# Patient Record
Sex: MALE | Race: WHITE | NOT HISPANIC OR LATINO | Employment: OTHER | URBAN - METROPOLITAN AREA
[De-identification: names, ages, dates, MRNs, and addresses within clinical notes are randomized per-mention and may not be internally consistent; named-entity substitution may affect disease eponyms.]

---

## 2017-02-09 ENCOUNTER — APPOINTMENT (OUTPATIENT)
Dept: GENERAL RADIOLOGY | Facility: CLINIC | Age: 72
End: 2017-02-09
Attending: STUDENT IN AN ORGANIZED HEALTH CARE EDUCATION/TRAINING PROGRAM
Payer: MEDICARE

## 2017-02-09 ENCOUNTER — HOSPITAL ENCOUNTER (EMERGENCY)
Facility: CLINIC | Age: 72
Discharge: ANOTHER HOSPITAL | End: 2017-02-09
Attending: STUDENT IN AN ORGANIZED HEALTH CARE EDUCATION/TRAINING PROGRAM | Admitting: STUDENT IN AN ORGANIZED HEALTH CARE EDUCATION/TRAINING PROGRAM
Payer: MEDICARE

## 2017-02-09 VITALS
RESPIRATION RATE: 15 BRPM | OXYGEN SATURATION: 91 % | DIASTOLIC BLOOD PRESSURE: 52 MMHG | SYSTOLIC BLOOD PRESSURE: 100 MMHG | TEMPERATURE: 99.3 F

## 2017-02-09 DIAGNOSIS — A41.9 SEPSIS, DUE TO UNSPECIFIED ORGANISM: ICD-10-CM

## 2017-02-09 DIAGNOSIS — R09.02 HYPOXIA: ICD-10-CM

## 2017-02-09 DIAGNOSIS — J10.1 INFLUENZA A: ICD-10-CM

## 2017-02-09 DIAGNOSIS — D69.6 THROMBOCYTOPENIA (H): ICD-10-CM

## 2017-02-09 DIAGNOSIS — J18.9 PNEUMONIA OF LEFT LOWER LOBE DUE TO INFECTIOUS ORGANISM: ICD-10-CM

## 2017-02-09 LAB
ALBUMIN SERPL-MCNC: 3.8 G/DL (ref 3.4–5)
ALP SERPL-CCNC: 59 U/L (ref 40–150)
ALT SERPL W P-5'-P-CCNC: 20 U/L (ref 0–70)
ANION GAP SERPL CALCULATED.3IONS-SCNC: 9 MMOL/L (ref 3–14)
APTT PPP: 36 SEC (ref 22–37)
AST SERPL W P-5'-P-CCNC: 36 U/L (ref 0–45)
BASE EXCESS BLDV CALC-SCNC: 0.8 MMOL/L
BILIRUB SERPL-MCNC: 0.5 MG/DL (ref 0.2–1.3)
BUN SERPL-MCNC: 18 MG/DL (ref 7–30)
CALCIUM SERPL-MCNC: 8.5 MG/DL (ref 8.5–10.1)
CHLORIDE SERPL-SCNC: 97 MMOL/L (ref 94–109)
CO2 SERPL-SCNC: 27 MMOL/L (ref 20–32)
CREAT SERPL-MCNC: 0.99 MG/DL (ref 0.66–1.25)
FLUAV+FLUBV AG SPEC QL: ABNORMAL
FLUAV+FLUBV AG SPEC QL: ABNORMAL
GFR SERPL CREATININE-BSD FRML MDRD: 74 ML/MIN/1.7M2
GLUCOSE SERPL-MCNC: 107 MG/DL (ref 70–99)
HCO3 BLDV-SCNC: 26 MMOL/L (ref 21–28)
INR PPP: 1.17 (ref 0.86–1.14)
LACTATE BLD-SCNC: 2.3 MMOL/L (ref 0.7–2.1)
PCO2 BLDV: 46 MM HG (ref 40–50)
PH BLDV: 7.37 PH (ref 7.32–7.43)
PO2 BLDV: 28 MM HG (ref 25–47)
POTASSIUM SERPL-SCNC: 3.5 MMOL/L (ref 3.4–5.3)
PROT SERPL-MCNC: 8.1 G/DL (ref 6.8–8.8)
SODIUM SERPL-SCNC: 133 MMOL/L (ref 133–144)
SPECIMEN SOURCE: ABNORMAL
TROPONIN I SERPL-MCNC: NORMAL UG/L (ref 0–0.04)

## 2017-02-09 PROCEDURE — 93005 ELECTROCARDIOGRAM TRACING: CPT

## 2017-02-09 PROCEDURE — 84484 ASSAY OF TROPONIN QUANT: CPT | Performed by: STUDENT IN AN ORGANIZED HEALTH CARE EDUCATION/TRAINING PROGRAM

## 2017-02-09 PROCEDURE — 85025 COMPLETE CBC W/AUTO DIFF WBC: CPT | Performed by: STUDENT IN AN ORGANIZED HEALTH CARE EDUCATION/TRAINING PROGRAM

## 2017-02-09 PROCEDURE — 99285 EMERGENCY DEPT VISIT HI MDM: CPT | Mod: 25 | Performed by: STUDENT IN AN ORGANIZED HEALTH CARE EDUCATION/TRAINING PROGRAM

## 2017-02-09 PROCEDURE — 96366 THER/PROPH/DIAG IV INF ADDON: CPT

## 2017-02-09 PROCEDURE — 85730 THROMBOPLASTIN TIME PARTIAL: CPT | Performed by: STUDENT IN AN ORGANIZED HEALTH CARE EDUCATION/TRAINING PROGRAM

## 2017-02-09 PROCEDURE — 80053 COMPREHEN METABOLIC PANEL: CPT | Performed by: STUDENT IN AN ORGANIZED HEALTH CARE EDUCATION/TRAINING PROGRAM

## 2017-02-09 PROCEDURE — 83605 ASSAY OF LACTIC ACID: CPT | Performed by: STUDENT IN AN ORGANIZED HEALTH CARE EDUCATION/TRAINING PROGRAM

## 2017-02-09 PROCEDURE — 71020 XR CHEST 2 VW: CPT

## 2017-02-09 PROCEDURE — 93010 ELECTROCARDIOGRAM REPORT: CPT | Performed by: STUDENT IN AN ORGANIZED HEALTH CARE EDUCATION/TRAINING PROGRAM

## 2017-02-09 PROCEDURE — 96365 THER/PROPH/DIAG IV INF INIT: CPT

## 2017-02-09 PROCEDURE — 87040 BLOOD CULTURE FOR BACTERIA: CPT | Mod: 91 | Performed by: STUDENT IN AN ORGANIZED HEALTH CARE EDUCATION/TRAINING PROGRAM

## 2017-02-09 PROCEDURE — 99285 EMERGENCY DEPT VISIT HI MDM: CPT | Mod: 25

## 2017-02-09 PROCEDURE — 25000128 H RX IP 250 OP 636: Performed by: STUDENT IN AN ORGANIZED HEALTH CARE EDUCATION/TRAINING PROGRAM

## 2017-02-09 PROCEDURE — 25000132 ZZH RX MED GY IP 250 OP 250 PS 637: Mod: GY | Performed by: STUDENT IN AN ORGANIZED HEALTH CARE EDUCATION/TRAINING PROGRAM

## 2017-02-09 PROCEDURE — A9270 NON-COVERED ITEM OR SERVICE: HCPCS | Mod: GY | Performed by: STUDENT IN AN ORGANIZED HEALTH CARE EDUCATION/TRAINING PROGRAM

## 2017-02-09 PROCEDURE — 87804 INFLUENZA ASSAY W/OPTIC: CPT | Performed by: STUDENT IN AN ORGANIZED HEALTH CARE EDUCATION/TRAINING PROGRAM

## 2017-02-09 PROCEDURE — 85610 PROTHROMBIN TIME: CPT | Performed by: STUDENT IN AN ORGANIZED HEALTH CARE EDUCATION/TRAINING PROGRAM

## 2017-02-09 PROCEDURE — 96361 HYDRATE IV INFUSION ADD-ON: CPT

## 2017-02-09 PROCEDURE — 25800025 ZZH RX 258: Performed by: STUDENT IN AN ORGANIZED HEALTH CARE EDUCATION/TRAINING PROGRAM

## 2017-02-09 PROCEDURE — 82803 BLOOD GASES ANY COMBINATION: CPT | Performed by: STUDENT IN AN ORGANIZED HEALTH CARE EDUCATION/TRAINING PROGRAM

## 2017-02-09 RX ORDER — OSELTAMIVIR PHOSPHATE 75 MG/1
75 CAPSULE ORAL 2 TIMES DAILY
Status: DISCONTINUED | OUTPATIENT
Start: 2017-02-09 | End: 2017-02-10 | Stop reason: HOSPADM

## 2017-02-09 RX ORDER — SODIUM CHLORIDE, SODIUM LACTATE, POTASSIUM CHLORIDE, CALCIUM CHLORIDE 600; 310; 30; 20 MG/100ML; MG/100ML; MG/100ML; MG/100ML
INJECTION, SOLUTION INTRAVENOUS CONTINUOUS
Status: DISCONTINUED | OUTPATIENT
Start: 2017-02-09 | End: 2017-02-09

## 2017-02-09 RX ORDER — CEFTRIAXONE 2 G/1
2 INJECTION, POWDER, FOR SOLUTION INTRAMUSCULAR; INTRAVENOUS EVERY 24 HOURS
Status: DISCONTINUED | OUTPATIENT
Start: 2017-02-09 | End: 2017-02-10 | Stop reason: HOSPADM

## 2017-02-09 RX ORDER — ACETAMINOPHEN 500 MG
1000 TABLET ORAL ONCE
Status: COMPLETED | OUTPATIENT
Start: 2017-02-09 | End: 2017-02-09

## 2017-02-09 RX ADMIN — SODIUM CHLORIDE 1000 ML: 9 INJECTION, SOLUTION INTRAVENOUS at 20:16

## 2017-02-09 RX ADMIN — OSELTAMIVIR PHOSPHATE 75 MG: 75 CAPSULE ORAL at 20:17

## 2017-02-09 RX ADMIN — IBUPROFEN 600 MG: 400 TABLET ORAL at 19:11

## 2017-02-09 RX ADMIN — SODIUM CHLORIDE 1000 ML: 9 INJECTION, SOLUTION INTRAVENOUS at 22:05

## 2017-02-09 RX ADMIN — AZITHROMYCIN MONOHYDRATE 500 MG: 500 INJECTION, POWDER, LYOPHILIZED, FOR SOLUTION INTRAVENOUS at 20:49

## 2017-02-09 RX ADMIN — SODIUM CHLORIDE, POTASSIUM CHLORIDE, SODIUM LACTATE AND CALCIUM CHLORIDE 1000 ML: 600; 310; 30; 20 INJECTION, SOLUTION INTRAVENOUS at 19:10

## 2017-02-09 RX ADMIN — ACETAMINOPHEN 1000 MG: 500 TABLET ORAL at 19:11

## 2017-02-09 RX ADMIN — CEFTRIAXONE 2 G: 2 INJECTION, POWDER, FOR SOLUTION INTRAMUSCULAR; INTRAVENOUS at 20:19

## 2017-02-10 NOTE — ED PROVIDER NOTES
History     Chief Complaint   Patient presents with     Fever     fever and cough started on Monday, just feeling weak      URI     HPI  Ag Galdamez is a 71 year old male with extensive history of tobacco use who present for evaluation of 2 days of fever, cough, nausea and vomiting. Patient explains that he has been around his grandchildren who had symptoms of nausea and vomiting. His symptoms initially started with nausea and vomiting Tuesday afternoon, since then he has been anorexic and developed cough with shortness of breath. The patient is no longer nauseous or suffering from vomiting. He denies headache, sore throat, neck stiffness, chest pain, abdominal pain, or genitourinary symptoms. He maintains that he has a baseline productive cough due to years of smoking but no formal diagnosis of emphysema or COPD. He is prescribed no routine medications. Of note, the patient states that he suffered from generalized weakness earlier today resulting in a fall, he bumped his right cheek on the bathroom floor but denies loss of consciousness, headache, or other injuries.    I have reviewed the Medications, Allergies, Past Medical and Surgical History, and Social History in the Epic system.    Patient Active Problem List   Diagnosis     Tobacco abuse     CARDIOVASCULAR SCREENING; LDL GOAL LESS THAN 130       Past Surgical History   Procedure Laterality Date     Hemorrhoidectomy  1963       Social History     Social History     Marital Status:      Spouse Name: N/A     Number of Children: N/A     Years of Education: N/A     Occupational History     Not on file.     Social History Main Topics     Smoking status: Current Every Day Smoker -- 1.00 packs/day     Types: Cigarettes     Smokeless tobacco: Never Used     Alcohol Use: Yes      Comment: occ     Drug Use: No     Sexual Activity: Not on file     Other Topics Concern     Not on file     Social History Narrative     No narrative on file       Family History    Problem Relation Age of Onset     HEART DISEASE Father          There is no immunization history on file for this patient.    Review of Systems  Constitutional: Positive for fever with chills.  HENT: Negative oral or throat pain.  Respiratory: Positive for productive cough with shortness of breath.  Cardiovascular: Negative for chest pain.  Gastrointestinal: Positive for nausea with vomiting, resolved. Denies abdominal pain, diarrhea, or blood with bowel movements.  Genitourinary: Negative for dysuria.  Musculoskeletal: Negative for back pain or recent injuries.  Neurological: Negative for headache or dizziness.    All others reviewed and are negative.      Physical Exam   BP: 140/71 mmHg  Heart Rate: 139  Temp: 102.1  F (38.9  C)  SpO2: 94 %  Physical Exam  Constitutional: Well developed, well nourished. Appears nontoxic and in no acute distress.   Head: Minor contusion of right maxilla with minimal tenderness, no crepitus, no other sign of facial trauma. Negative for Raccoon eyes and Mcdermott sign. No tenderness to palpation of facial bones or skull circumferentially.  Eyes: Conjunctivae are normal.  Neck: Neck supple and without nuchal rigidity.  Cardiovascular: No cyanosis. Tachycardia with regular rhythm. No audible murmurs noted.   Respiratory: Effort normal, no respiratory distress. Left lower lobe crackles, no wheezing or rhonchi.  Gastrointestinal: Soft, nondistended abdomen. Nontender and without guarding. No rigidity or rebound tenderness. Negative McBurney's point. Negative for Rodriguez's sign. Benign abdomen.   Musculoskeletal: Moves all extremities spontaneously and without complaint.  Neuro: Patient is alert and oriented.  Skin: Skin is warm and dry, not diaphoretic.      ED Course   Procedures               EKG Interpretation:      Interpreted by: Primitivo Ozuna  Time reviewed: Upon arrival     Symptoms at time of EKG: Fever and generalized weakness  Rhythm: Sinus  Rate: Tachycardia  Conduction:  RBBB  ST Segments/ T Waves: No pathologic ST-elevations or T-wave abnormalities.  Q Waves: None  Comparison to prior: None readily available    Clinical Impression: No sign of ischemia         Critical Care time:  none               Results for orders placed or performed during the hospital encounter of 02/09/17 (from the past 24 hour(s))   CBC with platelets differential   Result Value Ref Range    WBC 13.6 (H) 4.0 - 11.0 10e9/L    RBC Count 4.61 4.4 - 5.9 10e12/L    Hemoglobin 14.5 13.3 - 17.7 g/dL    Hematocrit 42.5 40.0 - 53.0 %    MCV 92 78 - 100 fl    MCH 31.5 26.5 - 33.0 pg    MCHC 34.1 31.5 - 36.5 g/dL    RDW 14.0 10.0 - 15.0 %    Platelet Count 16 (LL) 150 - 450 10e9/L    Diff Method Pending    Comprehensive metabolic panel   Result Value Ref Range    Sodium 133 133 - 144 mmol/L    Potassium 3.5 3.4 - 5.3 mmol/L    Chloride 97 94 - 109 mmol/L    Carbon Dioxide 27 20 - 32 mmol/L    Anion Gap 9 3 - 14 mmol/L    Glucose 107 (H) 70 - 99 mg/dL    Urea Nitrogen 18 7 - 30 mg/dL    Creatinine 0.99 0.66 - 1.25 mg/dL    GFR Estimate 74 >60 mL/min/1.7m2    GFR Estimate If Black 89 >60 mL/min/1.7m2    Calcium 8.5 8.5 - 10.1 mg/dL    Bilirubin Total 0.5 0.2 - 1.3 mg/dL    Albumin 3.8 3.4 - 5.0 g/dL    Protein Total 8.1 6.8 - 8.8 g/dL    Alkaline Phosphatase 59 40 - 150 U/L    ALT 20 0 - 70 U/L    AST 36 0 - 45 U/L   Lactic acid whole blood   Result Value Ref Range    Lactic Acid 2.3 (H) 0.7 - 2.1 mmol/L   INR   Result Value Ref Range    INR 1.17 (H) 0.86 - 1.14   PTT   Result Value Ref Range    PTT 36 22 - 37 sec   Troponin I   Result Value Ref Range    Troponin I ES  0.000 - 0.045 ug/L     <0.015  The 99th percentile for upper reference range is 0.045 ug/L.  Troponin values in   the range of 0.045 - 0.120 ug/L may be associated with risks of adverse   clinical events.     Blood gas venous   Result Value Ref Range    Ph Venous 7.37 7.32 - 7.43 pH    PCO2 Venous 46 40 - 50 mm Hg    PO2 Venous 28 25 - 47 mm Hg     Bicarbonate Venous 26 21 - 28 mmol/L    Base Excess Venous 0.8 mmol/L   Influenza A/B antigen   Result Value Ref Range    Influenza A/B Agn Specimen Nasopharyngeal     Influenza A (A) NEG     Positive   Test results must be correlated with clinical data. If necessary, results   should be confirmed by a molecular assay or viral culture.      Influenza B  NEG     Negative   Test results must be correlated with clinical data. If necessary, results   should be confirmed by a molecular assay or viral culture.     Chest XR,  PA & LAT    Narrative    XR CHEST 2 VW   2/9/2017 7:51 PM     HISTORY: cough, fever, LLL crackles    COMPARISON: None.      Impression    IMPRESSION: Hyperinflation consistent with COPD. On the lateral view  there is increased density posteriorly near the posterior costophrenic  angle that likely correlates to some retrocardiac increased density in  the AP view. These findings are suspicious for left lower lobe  infiltrate/pneumonia in a patient with COPD.    DEEPALI HANNON MD         Assessments & Plan (with Medical Decision Making)   Ag Galdamez is a 71 year old male who has not seen a physician in many years presenting to the department tonight for evaluation of 48 hours of fever, productive cough, dyspnea, and previously nausea with vomiting although those symptoms have resolved. His abdominal examination is benign however he was immediately found to be hypoxic on room air and febrile oral temperature 103 F. Lung auscultation reveals left lower lobe crackles and a chest x-ray confirms infiltrate. Rapid influenza test also positive for influenza A, patient admits he did not receive immunization this year. Interestingly his platelets are extremely low at 16.    The patient will require admission for continued monitoring and management of his pneumonia with positive influenza test meeting SIRS criteria. Unfortunately we do not have bed availability at our facility to accommodate this patient. He has  requested transfer to [facility] in [place]. Consulted hospitalist Dr. Marques regarding patient presentation workup. She agrees to accept patient as transfer and also with antibiotic selection of azithromycin and ceftriaxone.    The patient and accompanying family have been informed of his results and the recommendation for transfer. They have verbalized an understanding, all questions answered, and they are in agreement with the plan at this time.      Disclaimer: This note consists of symbols derived from keyboarding, dictation, and/or voice recognition software. As a result, there may be errors in the script that have gone undetected.  Please consider this when interpreting information found in the chart.        I have reviewed the nursing notes.    I have reviewed the findings, diagnosis, plan and need for follow up with the patient.    New Prescriptions    No medications on file       Final diagnoses:   Influenza A   Pneumonia of left lower lobe due to infectious organism   Sepsis, due to unspecified organism (H)   Hypoxia   Thrombocytopenia (H)       2/9/2017   [facility] EMERGENCY DEPARTMENT      Primitivo Ozuna DO  02/09/17 [id number]

## 2017-02-10 NOTE — ED NOTES
fever and cough started on Monday, just feeling weak, has an abrasion on his rt check from falling in the [place], wife states he can hardly walk, so she brought him in,

## 2017-02-13 LAB
BASOPHILS # BLD AUTO: 0.1 10E9/L (ref 0–0.2)
BASOPHILS NFR BLD AUTO: 1 %
DIFFERENTIAL METHOD BLD: ABNORMAL
ERYTHROCYTE [DISTWIDTH] IN BLOOD BY AUTOMATED COUNT: 14 % (ref 10–15)
HCT VFR BLD AUTO: 42.5 % (ref 40–53)
HGB BLD-MCNC: 14.5 G/DL (ref 13.3–17.7)
LYMPHOCYTES # BLD AUTO: 1.1 10E9/L (ref 0.8–5.3)
LYMPHOCYTES NFR BLD AUTO: 8 %
MCH RBC QN AUTO: 31.5 PG (ref 26.5–33)
MCHC RBC AUTO-ENTMCNC: 34.1 G/DL (ref 31.5–36.5)
MCV RBC AUTO: 92 FL (ref 78–100)
MONOCYTES # BLD AUTO: 0.7 10E9/L (ref 0–1.3)
MONOCYTES NFR BLD AUTO: 5 %
NEUTROPHILS # BLD AUTO: 11.7 10E9/L (ref 1.6–8.3)
NEUTROPHILS NFR BLD AUTO: 86 %
PLATELET # BLD AUTO: 16 10E9/L (ref 150–450)
RBC # BLD AUTO: 4.61 10E12/L (ref 4.4–5.9)
WBC # BLD AUTO: 13.6 10E9/L (ref 4–11)

## 2017-02-15 LAB
BACTERIA SPEC CULT: NORMAL
BACTERIA SPEC CULT: NORMAL
Lab: NORMAL
Lab: NORMAL
MICRO REPORT STATUS: NORMAL
MICRO REPORT STATUS: NORMAL
SPECIMEN SOURCE: NORMAL
SPECIMEN SOURCE: NORMAL

## 2017-02-17 ENCOUNTER — OFFICE VISIT - HEALTHEAST (OUTPATIENT)
Dept: FAMILY MEDICINE | Facility: CLINIC | Age: 72
End: 2017-02-17

## 2017-02-17 DIAGNOSIS — J96.01 ACUTE RESPIRATORY FAILURE WITH HYPOXIA (H): ICD-10-CM

## 2017-02-17 DIAGNOSIS — J18.9 CAP (COMMUNITY ACQUIRED PNEUMONIA): ICD-10-CM

## 2017-02-17 DIAGNOSIS — J10.1 INFLUENZA A: ICD-10-CM

## 2017-02-17 DIAGNOSIS — D69.6 THROMBOCYTOPENIA (H): ICD-10-CM

## 2017-02-17 ASSESSMENT — MIFFLIN-ST. JEOR: SCORE: 1272.73

## 2017-02-22 ENCOUNTER — OFFICE VISIT - HEALTHEAST (OUTPATIENT)
Dept: PULMONOLOGY | Facility: OTHER | Age: 72
End: 2017-02-22

## 2017-02-22 DIAGNOSIS — J44.9 CHRONIC OBSTRUCTIVE PULMONARY DISEASE, UNSPECIFIED COPD TYPE (H): ICD-10-CM

## 2017-02-22 ASSESSMENT — MIFFLIN-ST. JEOR: SCORE: 1271.71

## 2017-04-20 ENCOUNTER — COMMUNICATION - HEALTHEAST (OUTPATIENT)
Dept: FAMILY MEDICINE | Facility: CLINIC | Age: 72
End: 2017-04-20

## 2017-04-20 DIAGNOSIS — J96.01 ACUTE RESPIRATORY FAILURE WITH HYPOXIA (H): ICD-10-CM

## 2017-07-20 ENCOUNTER — AMBULATORY - HEALTHEAST (OUTPATIENT)
Dept: PULMONOLOGY | Facility: OTHER | Age: 72
End: 2017-07-20

## 2017-07-20 DIAGNOSIS — J44.9 COPD (CHRONIC OBSTRUCTIVE PULMONARY DISEASE) (H): ICD-10-CM

## 2021-05-19 ENCOUNTER — OFFICE VISIT (OUTPATIENT)
Dept: FAMILY MEDICINE | Facility: CLINIC | Age: 76
End: 2021-05-19
Payer: MEDICARE

## 2021-05-19 ENCOUNTER — ANCILLARY PROCEDURE (OUTPATIENT)
Dept: GENERAL RADIOLOGY | Facility: CLINIC | Age: 76
End: 2021-05-19
Attending: FAMILY MEDICINE
Payer: MEDICARE

## 2021-05-19 VITALS
DIASTOLIC BLOOD PRESSURE: 90 MMHG | WEIGHT: 139.8 LBS | RESPIRATION RATE: 20 BRPM | TEMPERATURE: 97 F | HEART RATE: 76 BPM | HEIGHT: 67 IN | BODY MASS INDEX: 21.94 KG/M2 | SYSTOLIC BLOOD PRESSURE: 164 MMHG | OXYGEN SATURATION: 97 %

## 2021-05-19 DIAGNOSIS — Z28.21 VACCINATION REFUSED BY PATIENT: ICD-10-CM

## 2021-05-19 DIAGNOSIS — Z12.5 SCREENING FOR PROSTATE CANCER: ICD-10-CM

## 2021-05-19 DIAGNOSIS — I10 UNCONTROLLED HYPERTENSION: ICD-10-CM

## 2021-05-19 DIAGNOSIS — J44.9 CHRONIC OBSTRUCTIVE PULMONARY DISEASE, UNSPECIFIED COPD TYPE (H): ICD-10-CM

## 2021-05-19 DIAGNOSIS — Z00.00 ENCOUNTER FOR MEDICARE ANNUAL WELLNESS EXAM: Primary | ICD-10-CM

## 2021-05-19 LAB
ALBUMIN SERPL-MCNC: 3.9 G/DL (ref 3.4–5)
ALBUMIN UR-MCNC: NEGATIVE MG/DL
ALP SERPL-CCNC: 71 U/L (ref 40–150)
ALT SERPL W P-5'-P-CCNC: 19 U/L (ref 0–70)
ANION GAP SERPL CALCULATED.3IONS-SCNC: 3 MMOL/L (ref 3–14)
APPEARANCE UR: CLEAR
AST SERPL W P-5'-P-CCNC: 23 U/L (ref 0–45)
BILIRUB SERPL-MCNC: 0.7 MG/DL (ref 0.2–1.3)
BILIRUB UR QL STRIP: NEGATIVE
BUN SERPL-MCNC: 13 MG/DL (ref 7–30)
CALCIUM SERPL-MCNC: 8.9 MG/DL (ref 8.5–10.1)
CHLORIDE SERPL-SCNC: 102 MMOL/L (ref 94–109)
CO2 SERPL-SCNC: 30 MMOL/L (ref 20–32)
COLOR UR AUTO: YELLOW
CREAT SERPL-MCNC: 0.99 MG/DL (ref 0.66–1.25)
ERYTHROCYTE [DISTWIDTH] IN BLOOD BY AUTOMATED COUNT: 14 % (ref 10–15)
GFR SERPL CREATININE-BSD FRML MDRD: 73 ML/MIN/{1.73_M2}
GLUCOSE SERPL-MCNC: 86 MG/DL (ref 70–99)
GLUCOSE UR STRIP-MCNC: NEGATIVE MG/DL
HCT VFR BLD AUTO: 42.5 % (ref 40–53)
HGB BLD-MCNC: 14.1 G/DL (ref 13.3–17.7)
HGB UR QL STRIP: NEGATIVE
KETONES UR STRIP-MCNC: ABNORMAL MG/DL
LDLC SERPL DIRECT ASSAY-MCNC: 80 MG/DL
LEUKOCYTE ESTERASE UR QL STRIP: NEGATIVE
MCH RBC QN AUTO: 32 PG (ref 26.5–33)
MCHC RBC AUTO-ENTMCNC: 33.2 G/DL (ref 31.5–36.5)
MCV RBC AUTO: 96 FL (ref 78–100)
NITRATE UR QL: NEGATIVE
PH UR STRIP: 5.5 PH (ref 5–7)
PLATELET # BLD AUTO: 62 10E9/L (ref 150–450)
POTASSIUM SERPL-SCNC: 4.1 MMOL/L (ref 3.4–5.3)
PROT SERPL-MCNC: 7.7 G/DL (ref 6.8–8.8)
PSA SERPL-ACNC: 1.03 UG/L (ref 0–4)
RBC # BLD AUTO: 4.41 10E12/L (ref 4.4–5.9)
RBC #/AREA URNS AUTO: ABNORMAL /HPF
SODIUM SERPL-SCNC: 135 MMOL/L (ref 133–144)
SOURCE: ABNORMAL
SP GR UR STRIP: 1.02 (ref 1–1.03)
TSH SERPL DL<=0.005 MIU/L-ACNC: 1.43 MU/L (ref 0.4–4)
UROBILINOGEN UR STRIP-ACNC: 0.2 EU/DL (ref 0.2–1)
WBC # BLD AUTO: 7.3 10E9/L (ref 4–11)
WBC #/AREA URNS AUTO: ABNORMAL /HPF

## 2021-05-19 PROCEDURE — 36415 COLL VENOUS BLD VENIPUNCTURE: CPT | Performed by: FAMILY MEDICINE

## 2021-05-19 PROCEDURE — 80053 COMPREHEN METABOLIC PANEL: CPT | Performed by: FAMILY MEDICINE

## 2021-05-19 PROCEDURE — G0103 PSA SCREENING: HCPCS | Performed by: FAMILY MEDICINE

## 2021-05-19 PROCEDURE — 99387 INIT PM E/M NEW PAT 65+ YRS: CPT | Performed by: FAMILY MEDICINE

## 2021-05-19 PROCEDURE — 99214 OFFICE O/P EST MOD 30 MIN: CPT | Mod: 25 | Performed by: FAMILY MEDICINE

## 2021-05-19 PROCEDURE — 81001 URINALYSIS AUTO W/SCOPE: CPT | Performed by: FAMILY MEDICINE

## 2021-05-19 PROCEDURE — 93000 ELECTROCARDIOGRAM COMPLETE: CPT | Performed by: FAMILY MEDICINE

## 2021-05-19 PROCEDURE — 71046 X-RAY EXAM CHEST 2 VIEWS: CPT | Performed by: RADIOLOGY

## 2021-05-19 PROCEDURE — 84443 ASSAY THYROID STIM HORMONE: CPT | Performed by: FAMILY MEDICINE

## 2021-05-19 PROCEDURE — 85027 COMPLETE CBC AUTOMATED: CPT | Performed by: FAMILY MEDICINE

## 2021-05-19 PROCEDURE — 83721 ASSAY OF BLOOD LIPOPROTEIN: CPT | Performed by: FAMILY MEDICINE

## 2021-05-19 RX ORDER — MULTIPLE VITAMINS W/ MINERALS TAB 9MG-400MCG
1 TAB ORAL DAILY
COMMUNITY

## 2021-05-19 RX ORDER — LISINOPRIL 2.5 MG/1
2.5 TABLET ORAL DAILY
Qty: 90 TABLET | Refills: 0 | Status: SHIPPED | OUTPATIENT
Start: 2021-05-19 | End: 2021-08-18

## 2021-05-19 ASSESSMENT — MIFFLIN-ST. JEOR: SCORE: 1313.79

## 2021-05-19 NOTE — PROGRESS NOTES
"SUBJECTIVE:   Ag Galdamez is a 76 year old male who presents for Preventive Visit.      Patient has been advised of split billing requirements and indicates understanding: Yes  Are you in the first 12 months of your Medicare Part B coverage?  No    Physical Health:    In general, how would you rate your overall physical health? good    Outside of work, how many days during the week do you exercise? none, stays active    Outside of work, approximately how many minutes a day do you exercise?not applicable    If you drink alcohol do you typically have >3 drinks per day or >7 drinks per week? No    Do you usually eat at least 4 servings of fruit and vegetables a day, include whole grains & fiber and avoid regularly eating high fat or \"junk\" foods? NO    Do you have any problems taking medications regularly?  No    Do you have any side effects from medications? not applicable    Needs assistance for the following daily activities: no assistance needed    Which of the following safety concerns are present in your home?  none identified     Hearing impairment: No    In the past 6 months, have you been bothered by leaking of urine? no    Said has COPD - given inhaler for maintenance and took for 6 months. Stopped by patient due to \"not doing anything for me\". Stopped the med 3 yrs ago.  No recent ER visit due to COPD.  Patient reports he has \"no stamina to speak of\" , and has to stop to rest a lot with movement or exertion.  Last PFT was possibly at least 3 yrs ago per patient.    BP on rooming in is high. Patient states he was never told before he has hypertension.  Recheck of BP is also high.  Denies chest pain, dyspnea, HA, BOV, dizziness or urinary changes.    Mental Health:    In general, how would you rate your overall mental or emotional health? good  PHQ-2 Score: 0    Do you feel safe in your environment? Yes    Have you ever done Advance Care Planning? (For example, a Health Directive, POLST, or a discussion " with a medical provider or your loved ones about your wishes): No, advance care planning information given to patient to review.  Patient plans to discuss their wishes with loved ones or provider.      Additional concerns to address?  No    Fall risk:  Fallen 2 or more times in the past year?: No  Any fall with injury in the past year?: No  Cognitive Screenin) Repeat 3 items (Leader, Season, Table)    2) Clock draw: NORMAL  3) 3 item recall: Recalls 1 object   Results: NORMAL clock, 1-2 items recalled: COGNITIVE IMPAIRMENT LESS LIKELY    Mini-CogTM Copyright S Mary. Licensed by the author for use in [facility]; reprinted with permission (mitchell@[facility]). All rights reserved.      Do you have sleep apnea, excessive snoring or daytime drowsiness?: no      Reviewed and updated as needed this visit by clinical staff  Tobacco  Allergies  Meds   Med Hx  Surg Hx  Fam Hx  Soc Hx        Reviewed and updated as needed this visit by Provider  Tobacco               Social History     Tobacco Use     Smoking status: Former Smoker     Packs/day: 1.00     Types: Cigarettes     Smokeless tobacco: Never Used   Substance Use Topics     Alcohol use: Yes     Comment: occ                           Current providers sharing in care for this patient include:   No care team member to display    The following health maintenance items are reviewed in Epic and correct as of today:  Health Maintenance   Topic Date Due     ANNUAL REVIEW OF HM ORDERS  Never done     COVID-19 Vaccine (1) Never done     HEPATITIS C SCREENING  Never done     DTAP/TDAP/TD IMMUNIZATION (1 - Tdap) Never done     ZOSTER IMMUNIZATION (1 of 2) Never done     FALL RISK ASSESSMENT  Never done     Pneumococcal Vaccine: 65+ Years (1 of 1 - PPSV23) Never done     LIPID  2015     INFLUENZA VACCINE (Season Ended) 2021     MEDICARE ANNUAL WELLNESS VISIT  2022     ADVANCE CARE PLANNING  2026     PHQ-2  Completed     IPV  "IMMUNIZATION  Aged Out     MENINGITIS IMMUNIZATION  Aged Out     HEPATITIS B IMMUNIZATION  Aged Out     Patient Active Problem List   Diagnosis     Tobacco abuse     CARDIOVASCULAR SCREENING; LDL GOAL LESS THAN 130     Vaccination refused by patient     Chronic obstructive pulmonary disease, unspecified COPD type (H)     Uncontrolled hypertension     Past Surgical History:   Procedure Laterality Date     HEMORRHOIDECTOMY  1963       Social History     Tobacco Use     Smoking status: Former Smoker     Packs/day: 1.00     Types: Cigarettes     Smokeless tobacco: Never Used   Substance Use Topics     Alcohol use: Yes     Comment: occ     Family History   Problem Relation Age of Onset     Heart Disease Father          Current Outpatient Medications   Medication Sig Dispense Refill     aspirin-acetaminophen-caffeine (EXCEDRIN MIGRAINE) 250-250-65 MG per tablet Take 2 tablets by mouth every 8 hours as needed for headaches       lisinopril (ZESTRIL) 2.5 MG tablet Take 1 tablet (2.5 mg) by mouth daily 90 tablet 0     multivitamin w/minerals (MULTI-VITAMIN) tablet Take 1 tablet by mouth daily       No Known Allergies  Pneumonia Vaccine: patient decliend    ROS:  Constitutional, HEENT, cardiovascular, pulmonary, GI, , musculoskeletal, neuro, skin, endocrine and psych systems are negative, except as otherwise noted.    OBJECTIVE:   BP (!) 164/90   Pulse 76   Temp 97  F (36.1  C) (Tympanic)   Resp 20   Ht 1.695 m (5' 6.75\")   Wt 63.4 kg (139 lb 12.8 oz)   SpO2 97%   BMI 22.06 kg/m   Estimated body mass index is 22.06 kg/m  as calculated from the following:    Height as of this encounter: 1.695 m (5' 6.75\").    Weight as of this encounter: 63.4 kg (139 lb 12.8 oz).  EXAM:   GENERAL APPEARANCE: healthy, alert and no distress  EYES: pink conj, no icterus, PERRL, EOMI  HENT: ear canals and TM's normal, nose and mouth without ulcers or lesions, oropharynx clear and oral mucous membranes moist  NECK: no adenopathy, no " asymmetry, masses, or scars and thyroid normal to palpation  RESP: lungs clear to auscultation - no rales, rhonchi or wheezes  CV: regular rates and rhythm, normal S1 S2, no S3 or S4, no murmur, click or rub, no peripheral edema and peripheral pulses strong  ABDOMEN: soft, nontender, no hepatosplenomegaly, no masses and bowel sounds normal  RECTAL: deferred  MS: no musculoskeletal defects are noted and gait is age appropriate without ataxia  SKIN: no suspicious lesions or rashes  NEURO: Normal strength and tone, sensory exam grossly normal, mentation intact and speech normal    Diagnostic Test Results:  EKG today as read by me: sinus rhythm, right bundle branch block present in the previous EKG; no acute ST T changes    ASSESSMENT / PLAN:   Ag was seen today for physical and health maintenance.    Diagnoses and all orders for this visit:    Encounter for Medicare annual wellness exam    Uncontrolled hypertension  -     EKG 12-lead complete w/read - Clinics  -     CBC with platelets  -     Comprehensive metabolic panel  -     LDL cholesterol direct  -     TSH with free T4 reflex  -     UA with Microscopic reflex to Culture  -     XR Chest 2 Views  -     lisinopril (ZESTRIL) 2.5 MG tablet; Take 1 tablet (2.5 mg) by mouth daily  -     OFFICE/OUTPT VISIT,EST,LEVL IV    Chronic obstructive pulmonary disease, unspecified COPD type (H)  -     General PFT Lab (Please always keep checked); Future  -     Pulmonary Function Test; Future  -     OFFICE/OUTPT VISIT,EST,LEVL IV    Screening for prostate cancer  -     Prostate spec antigen screen    Vaccination refused by patient        Patient has been advised of split billing requirements and indicates understanding: Yes    COUNSELING:  Reviewed preventive health counseling, as reflected in patient instructions       Regular exercise       Healthy diet/nutrition       Vision screening       Hearing screening       Dental care       Bladder control       Fall risk prevention    "    Immunizations    Declined: Pneumococcal, Td, Covid-19 and Zoster due to Conscientious objector               Consider lung cancer screening for ages 55-80 years and 30 pack-year smoking history        Colon cancer screening       Prostate cancer screening       The ASCVD Risk score (Jose DILLON Jr., et al., 2013) failed to calculate for the following reasons:    Cannot find a previous HDL lab    Cannot find a previous total cholesterol lab    Estimated body mass index is 22.06 kg/m  as calculated from the following:    Height as of this encounter: 1.695 m (5' 6.75\").    Weight as of this encounter: 63.4 kg (139 lb 12.8 oz).    Weight management plan: Discussed healthy diet and exercise guidelines    He reports that he has quit smoking. His smoking use included cigarettes. He smoked 1.00 pack per day. He has never used smokeless tobacco.    Appropriate preventive services were discussed with this patient, including applicable screening as appropriate for cardiovascular disease, diabetes, osteopenia/osteoporosis, and glaucoma.  As appropriate for age/gender, discussed screening for colorectal cancer, prostate cancer, breast cancer, and cervical cancer. Checklist reviewing preventive services available has been given to the patient.    Reviewed patients plan of care and provided an AVS. The Basic Care Plan (routine screening as documented in Health Maintenance) and Complex Care Plan (for patients with higher acuity and needing more deliberate coordination of services) for Ag meets the Care Plan requirement. This Care Plan has been established and reviewed with the Patient.    Counseling Resources:  ATP IV Guidelines  Pooled Cohorts Equation Calculator  Breast Cancer Risk Calculator  BRCA-Related Cancer Risk Assessment: FHS-7 Tool  FRAX Risk Assessment  ICSI Preventive Guidelines  Dietary Guidelines for Americans, 2010  USDA's MyPlate  ASA Prophylaxis  Lung CA Screening    Dago Stewart MD  [facility] " [facility]

## 2021-05-19 NOTE — LETTER
June 8, 2021      Ag Galdamez  [address]     [facility] [address]        Dear ,    We are writing to inform you of your test results.      Resulted Orders   CBC with platelets   Result Value Ref Range    WBC 7.3 4.0 - 11.0 10e9/L    RBC Count 4.41 4.4 - 5.9 10e12/L    Hemoglobin 14.1 13.3 - 17.7 g/dL    Hematocrit 42.5 40.0 - 53.0 %    MCV 96 78 - 100 fl    MCH 32.0 26.5 - 33.0 pg    MCHC 33.2 31.5 - 36.5 g/dL    RDW 14.0 10.0 - 15.0 %    Platelet Count 62 (L) 150 - 450 10e9/L   Comprehensive metabolic panel   Result Value Ref Range    Sodium 135 133 - 144 mmol/L    Potassium 4.1 3.4 - 5.3 mmol/L    Chloride 102 94 - 109 mmol/L    Carbon Dioxide 30 20 - 32 mmol/L    Anion Gap 3 3 - 14 mmol/L    Glucose 86 70 - 99 mg/dL    Urea Nitrogen 13 7 - 30 mg/dL    Creatinine 0.99 0.66 - 1.25 mg/dL    GFR Estimate 73 >60 mL/min/[1.73_m2]      Comment:      Non  GFR Calc  Starting 12/18/2018, serum creatinine based estimated GFR (eGFR) will be   calculated using the Chronic Kidney Disease Epidemiology Collaboration   (CKD-EPI) equation.      GFR Estimate If Black 85 >60 mL/min/[1.73_m2]      Comment:       GFR Calc  Starting 12/18/2018, serum creatinine based estimated GFR (eGFR) will be   calculated using the Chronic Kidney Disease Epidemiology Collaboration   (CKD-EPI) equation.      Calcium 8.9 8.5 - 10.1 mg/dL    Bilirubin Total 0.7 0.2 - 1.3 mg/dL    Albumin 3.9 3.4 - 5.0 g/dL    Protein Total 7.7 6.8 - 8.8 g/dL    Alkaline Phosphatase 71 40 - 150 U/L    ALT 19 0 - 70 U/L    AST 23 0 - 45 U/L   LDL cholesterol direct   Result Value Ref Range    LDL Cholesterol Direct 80 <100 mg/dL      Comment:      Desirable:       <100 mg/dl   TSH with free T4 reflex   Result Value Ref Range    TSH 1.43 0.40 - 4.00 mU/L   UA with Microscopic reflex to Culture   Result Value Ref Range    Color Urine Yellow     Appearance Urine Clear     Glucose Urine Negative  NEG^Negative mg/dL    Bilirubin Urine Negative NEG^Negative    Ketones Urine Trace (A) NEG^Negative mg/dL    Specific Gravity Urine 1.025 1.003 - 1.035    pH Urine 5.5 5.0 - 7.0 pH    Protein Albumin Urine Negative NEG^Negative mg/dL    Urobilinogen Urine 0.2 0.2 - 1.0 EU/dL    Nitrite Urine Negative NEG^Negative    Blood Urine Negative NEG^Negative    Leukocyte Esterase Urine Negative NEG^Negative    Source Midstream Urine     WBC Urine 0 - 5 OTO5^0 - 5 /HPF    RBC Urine O - 2 OTO2^O - 2 /HPF   Prostate spec antigen screen   Result Value Ref Range    PSA 1.03 0 - 4 ug/L      Comment:      Assay Method:  Chemiluminescence using Siemens Vista analyzer       If you have any questions or concerns, please call the clinic at the number listed above.       Sincerely,      Dago Stewart MD

## 2021-05-19 NOTE — PATIENT INSTRUCTIONS
You will be contacted in 1-2 days for results of your lab tests.    Your blood pressure today is uncontrolled.  Due to this medical treatment should be started to prevent complications.  Start lisinopril 2.5 mg daily.  Blood, urine, chest xray and EKG tests are ordered as baseline.  Low salt, low fat diet. Exercise as tolerated 30 mins per day 3 days a week.  Take meds as prescribed; call if with side effects.   Follow up in clinic in 1 week for a nurse visit to check blood pressure again.    Schedule lung function test as a reassessment of your COPD.  For your lung test, if you do not get a call within 2 days, please call [phone] to schedule.    You refused all recommended vaccinations today.  Reconsider this decision as you are considered a high risk individual if you contract pneumonia, and Covid-19. Tetanus vaccinataion is also protective for you. Influenza vaccine every fall helps protect you from complications from the flu.    Preventative Care Visits include: Yearly physicals, Well-child visits, Welcome to Medicare visits, & Medicare yearly wellness exams.    The purpose of these visits is to discuss your medical history and prevent health problems before you are sick.  You may need to pay a copay, coinsurance or deductible if your visit today includes testing or treating for a new or existing condition.    Additional charges may be incurred for today's visit. If you have questions about what your insurance plan covers, please contact your Insurance Company's member service department.  If you have questions specific to a bill you have already received from [employer], please contact the [facility]illing Office at [phone].       Patient Education   Personalized Prevention Plan  You are due for the preventive services outlined below.  Your care team is available to assist you in scheduling these services.  If you have already completed any of these items, please share that information with your care  team to update in your medical record.  Health Maintenance Due   Topic Date Due     ANNUAL REVIEW OF HM ORDERS  Never done     Discuss Advance Care Planning  Never done     Pneumococcal Vaccine (1 of 2 - PPSV23) Never done     COVID-19 Vaccine (1) Never done     Hepatitis C Screening  Never done     Diptheria Tetanus Pertussis (DTAP/TDAP/TD) Vaccine (1 - Tdap) Never done     Zoster (Shingles) Vaccine (1 of 2) Never done     FALL RISK ASSESSMENT  Never done     Cholesterol Lab  02/26/2015        Patient Education   Personalized Prevention Plan  You are due for the preventive services outlined below.  Your care team is available to assist you in scheduling these services.  If you have already completed any of these items, please share that information with your care team to update in your medical record.  Health Maintenance Due   Topic Date Due     ANNUAL REVIEW OF HM ORDERS  Never done     Discuss Advance Care Planning  Never done     COVID-19 Vaccine (1) Never done     Hepatitis C Screening  Never done     Diptheria Tetanus Pertussis (DTAP/TDAP/TD) Vaccine (1 - Tdap) Never done     Zoster (Shingles) Vaccine (1 of 2) Never done     FALL RISK ASSESSMENT  Never done     Pneumococcal Vaccine (1 of 1 - PPSV23) Never done     Cholesterol Lab  02/26/2015     Preventive Health Recommendations  See your health care provider every year to    Review health changes.     Discuss preventive care.      Review your medicines if your doctor has prescribed any.    Talk with your health care provider about whether you should have a test to screen for prostate cancer (PSA).    Every 3 years, have a diabetes test (fasting glucose). If you are at risk for diabetes, you should have this test more often.    Every 5 years, have a cholesterol test. Have this test more often if you are at risk for high cholesterol or heart disease.     Every 10 years, have a colonoscopy. Or, have a yearly FIT test (stool test). These exams will check for  colon cancer.    Talk to with your health care provider about screening for Abdominal Aortic Aneurysm if you have a family history of AAA or have a history of smoking.    Shots:     Get a flu shot each year.     Get a tetanus shot every 10 years.     Talk to your doctor about your pneumonia vaccines. There are now two you should receive - Pneumovax (PPSV 23) and Prevnar (PCV 13).    Talk to your pharmacist about a shingles vaccine.     Talk to your doctor about the hepatitis B vaccine.    Nutrition:     Eat at least 5 servings of fruits and vegetables each day.     Eat whole-grain bread, whole-wheat pasta and brown rice instead of white grains and rice.     Get adequate Calcium and Vitamin D.     Lifestyle    Exercise for at least 150 minutes a week (30 minutes a day, 5 days a week). This will help you control your weight and prevent disease.     Limit alcohol to one drink per day.     No smoking.     Wear sunscreen to prevent skin cancer.     See your dentist every six months for an exam and cleaning.     See your eye doctor every 1 to 2 years to screen for conditions such as glaucoma, macular degeneration and cataracts.    Personalized Prevention Plan  You are due for the preventive services outlined below.  Your care team is available to assist you in scheduling these services.  If you have already completed any of these items, please share that information with your care team to update in your medical record.  Health Maintenance   Topic Date Due     ANNUAL REVIEW OF HM ORDERS  Never done     ADVANCE CARE PLANNING  Never done     COVID-19 Vaccine (1) Never done     HEPATITIS C SCREENING  Never done     DTAP/TDAP/TD IMMUNIZATION (1 - Tdap) Never done     ZOSTER IMMUNIZATION (1 of 2) Never done     FALL RISK ASSESSMENT  Never done     Pneumococcal Vaccine: 65+ Years (1 of 1 - PPSV23) Never done     LIPID  02/26/2015     INFLUENZA VACCINE (Season Ended) 09/01/2021     MEDICARE ANNUAL WELLNESS VISIT  05/19/2022      PHQ-2  Completed     IPV IMMUNIZATION  Aged Out     MENINGITIS IMMUNIZATION  Aged Out     HEPATITIS B IMMUNIZATION  Aged Out

## 2021-05-19 NOTE — LETTER
June 8, 2021      Ag Galdamez  [address]     [facility] [address]        Dear ,    We are writing to inform you of your test results.      Resulted Orders   CBC with platelets   Result Value Ref Range    WBC 7.3 4.0 - 11.0 10e9/L    RBC Count 4.41 4.4 - 5.9 10e12/L    Hemoglobin 14.1 13.3 - 17.7 g/dL    Hematocrit 42.5 40.0 - 53.0 %    MCV 96 78 - 100 fl    MCH 32.0 26.5 - 33.0 pg    MCHC 33.2 31.5 - 36.5 g/dL    RDW 14.0 10.0 - 15.0 %    Platelet Count 62 (L) 150 - 450 10e9/L   Comprehensive metabolic panel   Result Value Ref Range    Sodium 135 133 - 144 mmol/L    Potassium 4.1 3.4 - 5.3 mmol/L    Chloride 102 94 - 109 mmol/L    Carbon Dioxide 30 20 - 32 mmol/L    Anion Gap 3 3 - 14 mmol/L    Glucose 86 70 - 99 mg/dL    Urea Nitrogen 13 7 - 30 mg/dL    Creatinine 0.99 0.66 - 1.25 mg/dL    GFR Estimate 73 >60 mL/min/[1.73_m2]      Comment:      Non  GFR Calc  Starting 12/18/2018, serum creatinine based estimated GFR (eGFR) will be   calculated using the Chronic Kidney Disease Epidemiology Collaboration   (CKD-EPI) equation.      GFR Estimate If Black 85 >60 mL/min/[1.73_m2]      Comment:       GFR Calc  Starting 12/18/2018, serum creatinine based estimated GFR (eGFR) will be   calculated using the Chronic Kidney Disease Epidemiology Collaboration   (CKD-EPI) equation.      Calcium 8.9 8.5 - 10.1 mg/dL    Bilirubin Total 0.7 0.2 - 1.3 mg/dL    Albumin 3.9 3.4 - 5.0 g/dL    Protein Total 7.7 6.8 - 8.8 g/dL    Alkaline Phosphatase 71 40 - 150 U/L    ALT 19 0 - 70 U/L    AST 23 0 - 45 U/L   LDL cholesterol direct   Result Value Ref Range    LDL Cholesterol Direct 80 <100 mg/dL      Comment:      Desirable:       <100 mg/dl   TSH with free T4 reflex   Result Value Ref Range    TSH 1.43 0.40 - 4.00 mU/L   UA with Microscopic reflex to Culture   Result Value Ref Range    Color Urine Yellow     Appearance Urine Clear     Glucose Urine Negative  NEG^Negative mg/dL    Bilirubin Urine Negative NEG^Negative    Ketones Urine Trace (A) NEG^Negative mg/dL    Specific Gravity Urine 1.025 1.003 - 1.035    pH Urine 5.5 5.0 - 7.0 pH    Protein Albumin Urine Negative NEG^Negative mg/dL    Urobilinogen Urine 0.2 0.2 - 1.0 EU/dL    Nitrite Urine Negative NEG^Negative    Blood Urine Negative NEG^Negative    Leukocyte Esterase Urine Negative NEG^Negative    Source Midstream Urine     WBC Urine 0 - 5 OTO5^0 - 5 /HPF    RBC Urine O - 2 OTO2^O - 2 /HPF   Prostate spec antigen screen   Result Value Ref Range    PSA 1.03 0 - 4 ug/L      Comment:      Assay Method:  Chemiluminescence using Siemens Vista analyzer     Aviod asa and nsaids watch for spontaneous bleeding if this occurs call 911 or go to the er.  If you have any questions or concerns, please call the clinic at the number listed above.       Sincerely,      Dago Stewart MD

## 2021-05-21 ENCOUNTER — TELEPHONE (OUTPATIENT)
Dept: FAMILY MEDICINE | Facility: CLINIC | Age: 76
End: 2021-05-21

## 2021-05-21 DIAGNOSIS — D69.6 THROMBOCYTOPENIA (H): Primary | ICD-10-CM

## 2021-05-26 ENCOUNTER — OFFICE VISIT (OUTPATIENT)
Dept: FAMILY MEDICINE | Facility: CLINIC | Age: 76
End: 2021-05-26
Payer: MEDICARE

## 2021-05-26 VITALS
DIASTOLIC BLOOD PRESSURE: 84 MMHG | BODY MASS INDEX: 22.19 KG/M2 | HEART RATE: 76 BPM | HEIGHT: 67 IN | RESPIRATION RATE: 20 BRPM | SYSTOLIC BLOOD PRESSURE: 190 MMHG | TEMPERATURE: 96.9 F | OXYGEN SATURATION: 97 % | WEIGHT: 141.4 LBS

## 2021-05-26 DIAGNOSIS — I10 UNCONTROLLED HYPERTENSION: ICD-10-CM

## 2021-05-26 DIAGNOSIS — I70.0 AORTIC CALCIFICATION (H): ICD-10-CM

## 2021-05-26 DIAGNOSIS — R53.83 EASY FATIGABILITY: ICD-10-CM

## 2021-05-26 DIAGNOSIS — J44.9 CHRONIC OBSTRUCTIVE PULMONARY DISEASE, UNSPECIFIED COPD TYPE (H): ICD-10-CM

## 2021-05-26 DIAGNOSIS — D69.6 THROMBOCYTOPENIA (H): Primary | ICD-10-CM

## 2021-05-26 PROCEDURE — 99214 OFFICE O/P EST MOD 30 MIN: CPT | Performed by: FAMILY MEDICINE

## 2021-05-26 ASSESSMENT — MIFFLIN-ST. JEOR: SCORE: 1321.05

## 2021-05-26 NOTE — PATIENT INSTRUCTIONS
To schedule the ct coronary calcium scan, call [phone].    You may contact respiratory tehrapy clinic - the number is in your previous visi summary.  If you canntot find it, call [phone] to schedule.    You will be called by hematology to schedule consult for the low platelet count.    Do start lisinopril as prescribed for your blood pressure.    Patient Education     High Blood Pressure, New, Begin Treatment    Your blood pressure was high enough today to start treatment with medicines. Often healthcare providers don t know what causes high blood pressure (hypertension). But it can be controlled with lifestyle changes and medicines. High blood pressure usually has no symptoms. But it can sometimes cause headache, dizziness, blurred vision, a rushing sound in your ears, chest pain, or shortness of breath. But even without symptoms, high blood pressure that s not treated raises your risk for heart attack, heart failure, kidney disease, vascular disease, and stroke. High blood pressure is a serious health risk and shouldn t be ignored.    Blood pressure measurements are given as 2 numbers.    Systolic blood pressure is the upper number. This is the pressure when the heart contracts.    Diastolic blood pressure is the lower number. This is the pressure when the heart relaxes between beats.  You will see your blood pressure readings written together. For example, a person with a systolic pressure of 118 and a diastolic pressure of 78 will have 118/78 written in the medical record.   Blood pressure is categorized as normal, elevated, or stage 1 or stage 2 high blood pressure:    Normal blood pressure is systolic of less than 120 and diastolic of less than 80 (120/80)    Elevated blood pressure is systolic of 120 to 129 and diastolic less than 80    Stage 1 high blood pressure is systolic is 130 to 139 or diastolic between 80 to 89    Stage 2 high blood pressure is when systolic is 140 or higher or the diastolic  is 90 or higher  Home care  If you have high blood pressure, do what's listed below to lower your blood pressure. If you are taking medicines for high blood pressure, these methods may reduce or end your need for medicines in the future.    Begin a weight-loss program if you are overweight.    Cut back on how much salt you get in your diet. Here s how to do this:  ? Don t eat foods that have a lot of salt. These include olives, pickles, smoked meats, and salted potato chips.  ? Don t add salt to your food at the table.  ? Use only small amounts of salt when cooking.  ? Review food labels to track how much salt is in prepared foods.  ? When eating out, ask that no additional salt be added to your food order.  ? Ask your provider about the DASH diet or the DASH (dietary approaches to stop hypertension) eating plan.    Start an exercise program. Talk with your healthcare provider about the type of exercise program that would be best for you. It doesn't have to be hard. Even brisk walking for 20 minutes 3 times a week is a good form of exercise.    Don t take medicines that have heart stimulants. This includes many over-the-counter cold and sinus decongestant pills and sprays, as well as diet pills. Check the warnings about high blood pressure on the label. Before purchasing any over-the-counter medicines or supplements, always ask the pharmacist about the product's potential interaction with your high blood pressure and your high blood pressure medicines.    Stimulants such as amphetamine or cocaine could be lethal for someone with high blood pressure. Never take these.    Limit how much caffeine you get in your diet. Switch to caffeine-free products.    Stop smoking. If you are a long-time smoker, this can be hard. Enroll in a stop-smoking program to make it more likely that you will quit for good. Or, talk with your healthcare provider about nicotine replacements or medicines that can help.    Learn how to handle  stress. This is an important part of any program to lower blood pressure. Learn about relaxation methods like meditation, yoga, or biofeedback.    If your provider prescribed medicines, take them exactly as directed. Missing doses may cause your blood pressure to get out of control.    If you miss a dose or doses, check with your healthcare provider or pharmacist about what to do.    Limit alcohol. Drinking too much alcohol can raise blood pressure. Men should have no more than 2 drinks a day. Women should have no more than 1. A drink is equal to 1 beer, or a small glass of wine, or a shot of liquor..    Consider buying an automatic blood pressure machine so you can check your blood pressure regularly at home. Your provider can make a recommendation. You can get one of these at most pharmacies.  The American Heart Association recommends the following guidelines for home blood pressure monitoring:    Don't smoke or drink coffee or other caffeinated drinks or exercise for 30 minutes before taking your blood pressure.    Go to the bathroom before the test.    Relax for 5 minutes before taking the measurement.    Sit with your back supported (don't sit on a couch or soft chair); keep your feet on the floor uncrossed. Place your arm on a solid flat surface (like a table) with the upper part of the arm at heart level. Place the middle of the cuff directly above the bend of the elbow. Check the monitor's instruction manual for an illustration.    Take multiple readings. When you measure, take 2 to 3 readings one minute apart and record all of the results.    Take your blood pressure at the same time every day, or as your healthcare provider recommends.    Record the date, time, and blood pressure reading.    Take the record with you to your next medical appointment. If your blood pressure monitor has a built-in memory, simply take the monitor with you to your next appointment.    Call your provider if you have several high  readings. Don't be frightened by a single high blood pressure reading, but if you get several high readings, check in with your healthcare provider.    Note: If blood pressure reaches a systolic (top number) of 180 or higher OR diastolic (bottom number) of 120 or higher, seek emergency medical treatment.  Follow-up care  Because a new blood pressure medicine was started today, it s important that you have your blood pressure rechecked. This is to make sure that the medicine is working and that you have no serious side effects. Keep all your follow up appointments. Write down medicine and blood pressure questions and bring them to your next appointment. If you have pressing concerns about your new medicine or your blood pressure, call your provider. Unless told otherwise, follow up with your healthcare provider within the next 3 days.  When to seek medical care  Call your healthcare provider right away if any of these occur:    Blood pressure reaches a systolic (top number) of 180 or higher, OR diastolic (bottom number) of 120 or higher, seek emergency medical treatment.    Chest pain or shortness of breath    Severe headache    Throbbing or rushing sound in the ears    Nosebleed    Sudden severe pain in your belly (abdomen)    Extreme drowsiness, confusion, or fainting    Dizziness or dizziness with a spinning sensation (vertigo)    Weakness of an arm or leg or one side of the face    You have problems speaking or seeing   [employer] last reviewed this educational content on 12/1/2019 2000-2021 [employer]. All rights reserved. This information is not intended as a substitute for professional medical care. Always follow your healthcare professional's instructions.           Patient Education     Thrombocytopenia  Thrombocytopenia occurs when there are fewer platelets in the blood than normal. Platelets (thrombocytes) are blood cells that are needed for clotting. They help stop or control bleeding when  you have a cut or wound. Thrombocytopenia can range from mild to severe. This depends on the number of platelets in your blood. If you have severe thrombocytopenia, you're at higher risk for bruising and bleeding.     What causes thrombocytopenia?  Platelets and other blood cells are made in the bone marrow. This is the soft, spongy part inside bones. Thrombocytopenia can result when:     The bone marrow doesn't make enough platelets    Platelets are destroyed by the body at a rate faster than they can be made in the bone marrow    Platelets become trapped in an enlarged spleen  These problems can happen because of many reasons, including:    Certain conditions that affect how platelets are made in the bone marrow, such as aplastic anemia, leukemia, and lymphoma    Certain medicines, such as some types of antibiotics, antiseizure medicines, and chemotherapy drugs    Certain viral infections, such as varicella (chicken pox), HIV, and Benny-Barr virus    Certain immune problems, such as lupus and immune thrombocytopenic purpura (ITP)    Certain conditions that can cause an enlarged spleen, such as cirrhosis and cancer    Alcohol abuse    Pregnancy  What are the symptoms of thrombocytopenia?  Possible symptoms include:    Severe bruising or bleeding    Small red or purple spots (petechiae) on the skin    Bleeding gums    Nosebleeds    Bleeding from a wound that stops and starts again    Bloody urine or stool    Heavy menstrual flow (women only)  How is thrombocytopenia diagnosed?  Your healthcare provider will ask about your symptoms and health history. You will also be examined. Tests will be done to confirm the problem as well. These may include:     A complete blood cell count (CBC). This test measures the amounts of the different types of cells in the blood. This includes the number of platelets in the blood platelet count).    A blood smear. This test checks for the different types of blood cells in the blood  and how they appear. A sample of your blood is spread on a glass slide and viewed under a microscope. A stain is used so the blood cells can be seen.    A bone marrow aspiration and biopsy. This test checks for problems with how the bone marrow makes blood cells. A needle is used to remove a sample of the bone marrow in your hipbone. The sample is then sent to a lab to be tested for problems.  How is thrombocytopenia treated?  Often, no treatment is needed for thrombocytopenia. Your healthcare provider will monitor your symptoms to see if they improve. Blood tests will also be done to check whether your platelet count returns to normal on its own. If treatment is needed, this may involve:     Treatment of the underlying cause. For instance, if a medicine is the cause, it may be stopped or changed.    Platelet transfusions. These help raise the number of healthy platelets in the body.    Blood transfusions. These help treat blood loss that may occur because of low platelets.    Medicines. These may be given to help prevent platelets from being destroyed. These may also be given to help the bone marrow make more platelets.    Surgery to remove the spleen. The spleen helps filter the blood. It also stores some blood cells, including platelets. When thrombocytopenia occurs because of the disorder ITP, platelets become trapped in the spleen. If ITP is not controlled with medicine, removing the spleen can be an effective treatment.  What can I expect for recovery and follow-up?  Thrombocytopenia may be a short-term (acute) problem that has no lasting effects. Or it may be an ongoing (chronic) problem. If your condition is chronic, you may need specific treatments to manage it. You may also need to take certain steps daily to reduce your risk of bleeding. For instance, you may be told to limit certain activities that increase your risk of injury. You may also be told to not drink alcohol and to not take certain medicines,  such as aspirin and ibuprofen. These can worsen your symptoms. In addition, you will need to know and watch for signs and symptoms of bleeding. Your healthcare provider will explain what you need to do based on the cause and long-term outlook of your thrombocytopenia.   When to call your healthcare provider  Call 911 if you have:     Severe bleeding that won't stop    Signs of bleeding in the brain, such as severe headache, dizziness, trouble with balance and coordination, abnormal walk, memory loss, and confusion  Call your healthcare provider right away if you have any of these:    Bruising that spreads or worsens    Increase of small red or purple spots (petechiae) on the skin    Bloody urine    Dark brown or black, tarry, or bloody stools    Bloody vomit    New or worsening symptoms  [employer] last reviewed this educational content on 4/1/2020 2000-2021 [employer]. All rights reserved. This information is not intended as a substitute for professional medical care. Always follow your healthcare professional's instructions.

## 2021-05-26 NOTE — PROGRESS NOTES
"    Assessment & Plan     Thrombocytopenia (H)  Reviewed previous records with patient. Has had this chronically. No previous work up.  Patient denies bleeding tendencies.  Reviewed med list - not on antiplatelets.  Discussed possible occult pathology, hence hematology consult.  Return precautions discussed and given to patient. Reasons to go to ER discussed in detail with patient.  - Oncology/Hematology Adult Referral    Chronic obstructive pulmonary disease, unspecified COPD type (H)  Reinforced in patient to schedule PFT. Reviewed the referral.  CXR supports COPD dx.  Not in distress today.  Consider LABA at least.    Easy fatigability  Likely COPD related. Advised this is a progressive disease.  Incidental finding of aortic calcification. No recent eval for CAD.  Discussed screening using coronary calcium score. Patient concurred.  Refer to cardiology as appropriate. May need lexiscan as well.  - CT Coronary Calcium Scan  - Oncology/Hematology Adult Referral    Uncontrolled hypertension  Patient states he has not started med yet as his spouse said she read that \"vitamin D will increase blood pressure\", so they stopped it in the hopes BP would drop. It did not.  Patient will start lisinoporil as prescribed  Recheck next week.  - CT Coronary Calcium Scan    Aortic calcification (H)  See above.  - CT Coronary Calcium Scan     Patient Instructions   To schedule the ct coronary calcium scan, call [phone].    You may contact respiratory tehrapy clinic - the number is in your previous visi summary.  If you canntot find it, call [phone] to schedule.    You will be called by hematology to schedule consult for the low platelet count.    Do start lisinopril as prescribed for your blood pressure.    Patient Education     High Blood Pressure, New, Begin Treatment    Your blood pressure was high enough today to start treatment with medicines. Often healthcare providers don t know what causes high blood pressure " (hypertension). But it can be controlled with lifestyle changes and medicines. High blood pressure usually has no symptoms. But it can sometimes cause headache, dizziness, blurred vision, a rushing sound in your ears, chest pain, or shortness of breath. But even without symptoms, high blood pressure that s not treated raises your risk for heart attack, heart failure, kidney disease, vascular disease, and stroke. High blood pressure is a serious health risk and shouldn t be ignored.    Blood pressure measurements are given as 2 numbers.    Systolic blood pressure is the upper number. This is the pressure when the heart contracts.    Diastolic blood pressure is the lower number. This is the pressure when the heart relaxes between beats.  You will see your blood pressure readings written together. For example, a person with a systolic pressure of 118 and a diastolic pressure of 78 will have 118/78 written in the medical record.   Blood pressure is categorized as normal, elevated, or stage 1 or stage 2 high blood pressure:    Normal blood pressure is systolic of less than 120 and diastolic of less than 80 (120/80)    Elevated blood pressure is systolic of 120 to 129 and diastolic less than 80    Stage 1 high blood pressure is systolic is 130 to 139 or diastolic between 80 to 89    Stage 2 high blood pressure is when systolic is 140 or higher or the diastolic is 90 or higher  Home care  If you have high blood pressure, do what's listed below to lower your blood pressure. If you are taking medicines for high blood pressure, these methods may reduce or end your need for medicines in the future.    Begin a weight-loss program if you are overweight.    Cut back on how much salt you get in your diet. Here s how to do this:  ? Don t eat foods that have a lot of salt. These include olives, pickles, smoked meats, and salted potato chips.  ? Don t add salt to your food at the table.  ? Use only small amounts of salt when  cooking.  ? Review food labels to track how much salt is in prepared foods.  ? When eating out, ask that no additional salt be added to your food order.  ? Ask your provider about the DASH diet or the DASH (dietary approaches to stop hypertension) eating plan.    Start an exercise program. Talk with your healthcare provider about the type of exercise program that would be best for you. It doesn't have to be hard. Even brisk walking for 20 minutes 3 times a week is a good form of exercise.    Don t take medicines that have heart stimulants. This includes many over-the-counter cold and sinus decongestant pills and sprays, as well as diet pills. Check the warnings about high blood pressure on the label. Before purchasing any over-the-counter medicines or supplements, always ask the pharmacist about the product's potential interaction with your high blood pressure and your high blood pressure medicines.    Stimulants such as amphetamine or cocaine could be lethal for someone with high blood pressure. Never take these.    Limit how much caffeine you get in your diet. Switch to caffeine-free products.    Stop smoking. If you are a long-time smoker, this can be hard. Enroll in a stop-smoking program to make it more likely that you will quit for good. Or, talk with your healthcare provider about nicotine replacements or medicines that can help.    Learn how to handle stress. This is an important part of any program to lower blood pressure. Learn about relaxation methods like meditation, yoga, or biofeedback.    If your provider prescribed medicines, take them exactly as directed. Missing doses may cause your blood pressure to get out of control.    If you miss a dose or doses, check with your healthcare provider or pharmacist about what to do.    Limit alcohol. Drinking too much alcohol can raise blood pressure. Men should have no more than 2 drinks a day. Women should have no more than 1. A drink is equal to 1 beer, or a  small glass of wine, or a shot of liquor..    Consider buying an automatic blood pressure machine so you can check your blood pressure regularly at home. Your provider can make a recommendation. You can get one of these at most pharmacies.  The American Heart Association recommends the following guidelines for home blood pressure monitoring:    Don't smoke or drink coffee or other caffeinated drinks or exercise for 30 minutes before taking your blood pressure.    Go to the bathroom before the test.    Relax for 5 minutes before taking the measurement.    Sit with your back supported (don't sit on a couch or soft chair); keep your feet on the floor uncrossed. Place your arm on a solid flat surface (like a table) with the upper part of the arm at heart level. Place the middle of the cuff directly above the bend of the elbow. Check the monitor's instruction manual for an illustration.    Take multiple readings. When you measure, take 2 to 3 readings one minute apart and record all of the results.    Take your blood pressure at the same time every day, or as your healthcare provider recommends.    Record the date, time, and blood pressure reading.    Take the record with you to your next medical appointment. If your blood pressure monitor has a built-in memory, simply take the monitor with you to your next appointment.    Call your provider if you have several high readings. Don't be frightened by a single high blood pressure reading, but if you get several high readings, check in with your healthcare provider.    Note: If blood pressure reaches a systolic (top number) of 180 or higher OR diastolic (bottom number) of 120 or higher, seek emergency medical treatment.  Follow-up care  Because a new blood pressure medicine was started today, it s important that you have your blood pressure rechecked. This is to make sure that the medicine is working and that you have no serious side effects. Keep all your follow up  appointments. Write down medicine and blood pressure questions and bring them to your next appointment. If you have pressing concerns about your new medicine or your blood pressure, call your provider. Unless told otherwise, follow up with your healthcare provider within the next 3 days.  When to seek medical care  Call your healthcare provider right away if any of these occur:    Blood pressure reaches a systolic (top number) of 180 or higher, OR diastolic (bottom number) of 120 or higher, seek emergency medical treatment.    Chest pain or shortness of breath    Severe headache    Throbbing or rushing sound in the ears    Nosebleed    Sudden severe pain in your belly (abdomen)    Extreme drowsiness, confusion, or fainting    Dizziness or dizziness with a spinning sensation (vertigo)    Weakness of an arm or leg or one side of the face    You have problems speaking or seeing   [employer] last reviewed this educational content on 12/1/2019 2000-2021 [employer]. All rights reserved. This information is not intended as a substitute for professional medical care. Always follow your healthcare professional's instructions.           Patient Education     Thrombocytopenia  Thrombocytopenia occurs when there are fewer platelets in the blood than normal. Platelets (thrombocytes) are blood cells that are needed for clotting. They help stop or control bleeding when you have a cut or wound. Thrombocytopenia can range from mild to severe. This depends on the number of platelets in your blood. If you have severe thrombocytopenia, you're at higher risk for bruising and bleeding.     What causes thrombocytopenia?  Platelets and other blood cells are made in the bone marrow. This is the soft, spongy part inside bones. Thrombocytopenia can result when:     The bone marrow doesn't make enough platelets    Platelets are destroyed by the body at a rate faster than they can be made in the bone marrow    Platelets become  trapped in an enlarged spleen  These problems can happen because of many reasons, including:    Certain conditions that affect how platelets are made in the bone marrow, such as aplastic anemia, leukemia, and lymphoma    Certain medicines, such as some types of antibiotics, antiseizure medicines, and chemotherapy drugs    Certain viral infections, such as varicella (chicken pox), HIV, and Benny-Barr virus    Certain immune problems, such as lupus and immune thrombocytopenic purpura (ITP)    Certain conditions that can cause an enlarged spleen, such as cirrhosis and cancer    Alcohol abuse    Pregnancy  What are the symptoms of thrombocytopenia?  Possible symptoms include:    Severe bruising or bleeding    Small red or purple spots (petechiae) on the skin    Bleeding gums    Nosebleeds    Bleeding from a wound that stops and starts again    Bloody urine or stool    Heavy menstrual flow (women only)  How is thrombocytopenia diagnosed?  Your healthcare provider will ask about your symptoms and health history. You will also be examined. Tests will be done to confirm the problem as well. These may include:     A complete blood cell count (CBC). This test measures the amounts of the different types of cells in the blood. This includes the number of platelets in the blood platelet count).    A blood smear. This test checks for the different types of blood cells in the blood and how they appear. A sample of your blood is spread on a glass slide and viewed under a microscope. A stain is used so the blood cells can be seen.    A bone marrow aspiration and biopsy. This test checks for problems with how the bone marrow makes blood cells. A needle is used to remove a sample of the bone marrow in your hipbone. The sample is then sent to a lab to be tested for problems.  How is thrombocytopenia treated?  Often, no treatment is needed for thrombocytopenia. Your healthcare provider will monitor your symptoms to see if they  improve. Blood tests will also be done to check whether your platelet count returns to normal on its own. If treatment is needed, this may involve:     Treatment of the underlying cause. For instance, if a medicine is the cause, it may be stopped or changed.    Platelet transfusions. These help raise the number of healthy platelets in the body.    Blood transfusions. These help treat blood loss that may occur because of low platelets.    Medicines. These may be given to help prevent platelets from being destroyed. These may also be given to help the bone marrow make more platelets.    Surgery to remove the spleen. The spleen helps filter the blood. It also stores some blood cells, including platelets. When thrombocytopenia occurs because of the disorder ITP, platelets become trapped in the spleen. If ITP is not controlled with medicine, removing the spleen can be an effective treatment.  What can I expect for recovery and follow-up?  Thrombocytopenia may be a short-term (acute) problem that has no lasting effects. Or it may be an ongoing (chronic) problem. If your condition is chronic, you may need specific treatments to manage it. You may also need to take certain steps daily to reduce your risk of bleeding. For instance, you may be told to limit certain activities that increase your risk of injury. You may also be told to not drink alcohol and to not take certain medicines, such as aspirin and ibuprofen. These can worsen your symptoms. In addition, you will need to know and watch for signs and symptoms of bleeding. Your healthcare provider will explain what you need to do based on the cause and long-term outlook of your thrombocytopenia.   When to call your healthcare provider  Call 911 if you have:     Severe bleeding that won't stop    Signs of bleeding in the brain, such as severe headache, dizziness, trouble with balance and coordination, abnormal walk, memory loss, and confusion  Call your healthcare provider  right away if you have any of these:    Bruising that spreads or worsens    Increase of small red or purple spots (petechiae) on the skin    Bloody urine    Dark brown or black, tarry, or bloody stools    Bloody vomit    New or worsening symptoms  [employer] last reviewed this educational content on 4/1/2020 2000-2021 [employer]. All rights reserved. This information is not intended as a substitute for professional medical care. Always follow your healthcare professional's instructions.               Return in about 1 week (around 6/2/2021) for Blood pressure recheck with a nurse visit.    Dago Stewart MD  [facility]    Chris Luong is a 76 year old who presents for the following health issues     HPI     Chief Complaint   Patient presents with     Results     Discuss results of CXR and labs from 5/19/21.     Found to have uncontrolle dhypertension last week. Was prescribed lisinopril 2.5 mg -  Never took it.  Labs and imaging done last week as well as baseline. Results below.    Denies chest pain,  HA, BOV, dizziness or urinary changes.  Patient reports easy fatigability stable since December 2020.   Has known COPD for years - no  Inhalers.  No known CAD.  Has chronically low platelet count. Denies tendency for prolonged bleeding. No previous eval.    BP Readings from Last 3 Encounters:   05/26/21 (!) 190/84   05/19/21 (!) 164/90   02/09/17 100/52    Wt Readings from Last 3 Encounters:   05/26/21 64.1 kg (141 lb 6.4 oz)   05/19/21 63.4 kg (139 lb 12.8 oz)   09/08/15 62.6 kg (138 lb)                  Patient Active Problem List   Diagnosis     Tobacco abuse     CARDIOVASCULAR SCREENING; LDL GOAL LESS THAN 130     Vaccination refused by patient     Chronic obstructive pulmonary disease, unspecified COPD type (H)     Uncontrolled hypertension     Past Surgical History:   Procedure Laterality Date     HEMORRHOIDECTOMY  1963       Social History     Tobacco Use      "Smoking status: Former Smoker     Packs/day: 1.00     Types: Cigarettes     Smokeless tobacco: Never Used   Substance Use Topics     Alcohol use: Yes     Comment: occ     Family History   Problem Relation Age of Onset     Heart Disease Father          Current Outpatient Medications   Medication Sig Dispense Refill     aspirin-acetaminophen-caffeine (EXCEDRIN MIGRAINE) 250-250-65 MG per tablet Take 2 tablets by mouth every 8 hours as needed for headaches       lisinopril (ZESTRIL) 2.5 MG tablet Take 1 tablet (2.5 mg) by mouth daily 90 tablet 0     multivitamin w/minerals (MULTI-VITAMIN) tablet Take 1 tablet by mouth daily       No Known Allergies  Review of Systems   Constitutional, HEENT, cardiovascular, pulmonary, GI, , musculoskeletal, neuro, skin, endocrine and psych systems are negative, except as otherwise noted.      Objective    BP (!) 190/84 (BP Location: Right arm, Patient Position: Chair, Cuff Size: Adult Regular)   Pulse 76   Temp 96.9  F (36.1  C) (Tympanic)   Resp 20   Ht 1.695 m (5' 6.75\")   Wt 64.1 kg (141 lb 6.4 oz)   SpO2 97%   BMI 22.31 kg/m    Body mass index is 22.31 kg/m .  Physical Exam   GENERAL: healthy, alert and no distress, ambulatory w/o assist  NECK: no tenderness, no adenopathy,  Thyroid not enlarged  RESP: lungs clear to auscultation - no rales, no rhonchi, no wheezes  CV: regular rates and rhythm, no murmur  MS: no edema  SKIN: no suspicious lesions, no rashes    Office Visit on 05/19/2021   Component Date Value Ref Range Status     WBC 05/19/2021 7.3  4.0 - 11.0 10e9/L Final     RBC Count 05/19/2021 4.41  4.4 - 5.9 10e12/L Final     Hemoglobin 05/19/2021 14.1  13.3 - 17.7 g/dL Final     Hematocrit 05/19/2021 42.5  40.0 - 53.0 % Final     MCV 05/19/2021 96  78 - 100 fl Final     MCH 05/19/2021 32.0  26.5 - 33.0 pg Final     MCHC 05/19/2021 33.2  31.5 - 36.5 g/dL Final     RDW 05/19/2021 14.0  10.0 - 15.0 % Final     Platelet Count 05/19/2021 62* 150 - 450 10e9/L Final     " Sodium 05/19/2021 135  133 - 144 mmol/L Final     Potassium 05/19/2021 4.1  3.4 - 5.3 mmol/L Final     Chloride 05/19/2021 102  94 - 109 mmol/L Final     Carbon Dioxide 05/19/2021 30  20 - 32 mmol/L Final     Anion Gap 05/19/2021 3  3 - 14 mmol/L Final     Glucose 05/19/2021 86  70 - 99 mg/dL Final     Urea Nitrogen 05/19/2021 13  7 - 30 mg/dL Final     Creatinine 05/19/2021 0.99  0.66 - 1.25 mg/dL Final     GFR Estimate 05/19/2021 73  >60 mL/min/[1.73_m2] Final    Comment: Non  GFR Calc  Starting 12/18/2018, serum creatinine based estimated GFR (eGFR) will be   calculated using the Chronic Kidney Disease Epidemiology Collaboration   (CKD-EPI) equation.       GFR Estimate If Black 05/19/2021 85  >60 mL/min/[1.73_m2] Final    Comment:  GFR Calc  Starting 12/18/2018, serum creatinine based estimated GFR (eGFR) will be   calculated using the Chronic Kidney Disease Epidemiology Collaboration   (CKD-EPI) equation.       Calcium 05/19/2021 8.9  8.5 - 10.1 mg/dL Final     Bilirubin Total 05/19/2021 0.7  0.2 - 1.3 mg/dL Final     Albumin 05/19/2021 3.9  3.4 - 5.0 g/dL Final     Protein Total 05/19/2021 7.7  6.8 - 8.8 g/dL Final     Alkaline Phosphatase 05/19/2021 71  40 - 150 U/L Final     ALT 05/19/2021 19  0 - 70 U/L Final     AST 05/19/2021 23  0 - 45 U/L Final     LDL Cholesterol Direct 05/19/2021 80  <100 mg/dL Final    Desirable:       <100 mg/dl     TSH 05/19/2021 1.43  0.40 - 4.00 mU/L Final     Color Urine 05/19/2021 Yellow   Final     Appearance Urine 05/19/2021 Clear   Final     Glucose Urine 05/19/2021 Negative  NEG^Negative mg/dL Final     Bilirubin Urine 05/19/2021 Negative  NEG^Negative Final     Ketones Urine 05/19/2021 Trace* NEG^Negative mg/dL Final     Specific Gravity Urine 05/19/2021 1.025  1.003 - 1.035 Final     pH Urine 05/19/2021 5.5  5.0 - 7.0 pH Final     Protein Albumin Urine 05/19/2021 Negative  NEG^Negative mg/dL Final     Urobilinogen Urine 05/19/2021 0.2  0.2 -  1.0 EU/dL Final     Nitrite Urine 05/19/2021 Negative  NEG^Negative Final     Blood Urine 05/19/2021 Negative  NEG^Negative Final     Leukocyte Esterase Urine 05/19/2021 Negative  NEG^Negative Final     Source 05/19/2021 Midstream Urine   Final     WBC Urine 05/19/2021 0 - 5  OTO5^0 - 5 /HPF Final     RBC Urine 05/19/2021 O - 2  OTO2^O - 2 /HPF Final     PSA 05/19/2021 1.03  0 - 4 ug/L Final    Assay Method:  Chemiluminescence using Siemens Vista analyzer

## 2021-05-30 VITALS — WEIGHT: 129 LBS | BODY MASS INDEX: 20.25 KG/M2 | HEIGHT: 67 IN

## 2021-05-30 VITALS — WEIGHT: 127.9 LBS | HEIGHT: 68 IN | BODY MASS INDEX: 19.38 KG/M2

## 2021-06-04 ENCOUNTER — ALLIED HEALTH/NURSE VISIT (OUTPATIENT)
Dept: FAMILY MEDICINE | Facility: CLINIC | Age: 76
End: 2021-06-04
Attending: INTERNAL MEDICINE
Payer: COMMERCIAL

## 2021-06-04 ENCOUNTER — TELEPHONE (OUTPATIENT)
Dept: FAMILY MEDICINE | Facility: CLINIC | Age: 76
End: 2021-06-04
Payer: COMMERCIAL

## 2021-06-04 VITALS — DIASTOLIC BLOOD PRESSURE: 78 MMHG | HEART RATE: 86 BPM | SYSTOLIC BLOOD PRESSURE: 138 MMHG

## 2021-06-04 DIAGNOSIS — I10 UNCONTROLLED HYPERTENSION: Primary | ICD-10-CM

## 2021-06-04 PROCEDURE — 99207 PR NO CHARGE NURSE ONLY: CPT

## 2021-06-04 PROCEDURE — 99207 PR NO CHARGE NURSE ONLY: CPT | Performed by: FAMILY MEDICINE

## 2021-06-04 NOTE — PROGRESS NOTES
Ag Galdamez is a 76 year old year old patient who comes in today for a Blood Pressure check because of ongoing blood pressure monitoring.  Vital Signs as repeated by /78 pulse 86  Patient is taking medication as prescribed  Patient is tolerating medications well.  Patient is not monitoring Blood Pressure at home.   Current complaints: none  Disposition:  patient instructed to continue with increased activity and stay away from high sodium foods.

## 2021-06-08 NOTE — PROGRESS NOTES
SUBJECTIVE:    This is a pleasant 71-year-old male who presents to the [facility] for the first time.  He is a resident of [place] who admits he typically does not go to the doctor.  However, he developed a cough and increasing shortness of breath and presented to [place] emergency department.  They were quite busy and he was acutely ill so they transferred him by ambulance to [facility] where he was admitted for influenza a as well as left lower lobe pneumonia confirmed by a chest x-ray from [facility].  He did have respiratory distress.  Also, given a 50+ pack year history of tobacco use he has probable COPD.  He was treated supportively with oxygen and also treated with Tamiflu.  He was advised to quit tobacco and was treated with Levaquin given his left lower lobe pneumonia.  He received albuterol nebulizer treatments and was also treated with prednisone.  During the course of the hospitalization it was felt that his low platelet count of approximately 10,000 was due to a viral infection.  As a result, he needs to have his blood counts rechecked today.  He reports that his energy level remains low but he does not feel acutely short of breath currently.  His coughing has improved.  He was treated with a Combivent inhaler and continues to use that.  Past medical history is negative for chronic health conditions but as stated he does not routinely go to the doctor..  He has had a previous hemorrhoidectomy.  He is retired .  He is present with his wife today.  Review of systems is negative for chest pain, headache, acute shortness of breath currently, or other concerns.  He is using the nicotine patch and is adamant that he will quit tobacco.      Patient Active Problem List   Diagnosis     Influenza A     ARF (acute respiratory failure)     CAP (community acquired pneumonia)     Thrombocytopenia       Current Outpatient Prescriptions on File Prior to Visit   Medication Sig      ascorbic acid, vitamin C, (ASCORBIC ACID WITH WILMAR HIPS) 500 MG tablet Take 500 mg by mouth daily.     ipratropium-albuterol (COMBIVENT RESPIMAT)  mcg/actuation Mist inhaler Inhale 1 puff 4 (four) times a day as needed (wheezing or SOB).     [DISCONTINUED] levoFLOXacin (LEVAQUIN) 750 MG tablet Take 1 tablet (750 mg total) by mouth Daily at 6:00 am for 5 days.     No current facility-administered medications on file prior to visit.        No Known Allergies         A 12 point comprehensive review of systems was negative except as noted.      OBJECTIVE:     Vitals:    02/17/17 1000   BP: 126/62   Pulse: 98   Resp: 16   Temp: 98.1  F (36.7  C)   SpO2: 96%       General: Alert, not acutely distressed   Head:  Atraumatic, normocephalic  Ears:  TMs normal pearly-gray  Eyes:  PERRL, extraocular movements are intact  Nose:  Septum midline  Neck:  Supple without adenopathy or thyromegaly   Cardiovascular: S1-S2 with regular rate and without murmur, rub, or gallop   Lungs:  There are some crackles involving the left lung base   Extremities: Without cyanosis or edema   Neuro:  CN II-XII appear intact        Laboratory Results:    Results for orders placed or performed during the hospital encounter of 02/09/17   Culture, Blood Aerobic Bottle   Result Value Ref Range    Aerobic Blood Culture Bottle No Growth No Growth, No organisms seen, bottle returned to instrument   Culture, Blood Anaerobic Bottle   Result Value Ref Range    Anaerobic Blood Culture Bottle No Growth No Growth, No organisms seen, bottle returned to instrument   Creatinine   Result Value Ref Range    Creatinine 0.76 0.70 - 1.30 mg/dL    GFR MDRD Af Amer >60 >60 mL/min/1.73m2    GFR MDRD Non Af Amer >60 >60 mL/min/1.73m2   Platelet Count   Result Value Ref Range    Platelets 13 (LL) 140 - 440 thou/uL   Basic metabolic panel   Result Value Ref Range    Sodium 138 136 - 145 mmol/L    Potassium 3.7 3.5 - 5.0 mmol/L    Chloride 109 (H) 98 - 107 mmol/L    CO2 24  22 - 31 mmol/L    Anion Gap, Calculation 5 5 - 18 mmol/L    Glucose 104 70 - 125 mg/dL    Calcium 7.5 (L) 8.5 - 10.5 mg/dL    BUN 19 8 - 28 mg/dL    Creatinine 0.80 0.70 - 1.30 mg/dL    GFR MDRD Af Amer >60 >60 mL/min/1.73m2    GFR MDRD Non Af Amer >60 >60 mL/min/1.73m2   Morphology,Smear Review (MORP)   Result Value Ref Range    Pathology, Smear Review See Separate Pathology Report (!) (none)    WBC 9.2 4.0 - 11.0 thou/uL    RBC 3.88 (L) 4.40 - 6.20 mill/uL    Hemoglobin 12.3 (L) 14.0 - 18.0 g/dL    Hematocrit 37.0 (L) 40.0 - 54.0 %    MCV 95 80 - 100 fL    MCH 31.6 27.0 - 34.0 pg    MCHC 33.1 32.0 - 36.0 g/dL    RDW 14.6 (H) 11.0 - 14.5 %    Platelets 10 (LL) 140 - 440 thou/uL    MPV 8.8 7.0 - 10.0 fL    Neutrophils % 82 (H) 50 - 70 %    Lymphocytes % 13 (L) 20 - 40 %    Monocytes % 5 2 - 10 %    Eosinophils % 0 0 - 6 %    Basophils % 0 0 - 2 %    Neutrophils Absolute 7.5 2.0 - 7.7 thou/uL    Lymphocytes Absolute 1.2 0.8 - 4.4 thou/uL    Monocytes Absolute 0.5 0.0 - 0.9 thou/uL    Eosinophils Absolute 0.0 0.0 - 0.4 thou/uL    Basophils Absolute 0.0 0.0 - 0.2 thou/uL   Lactic Acid   Result Value Ref Range    Lactic Acid 1.1 0.5 - 2.2 mmol/L   HM1 (CBC with Diff)   Result Value Ref Range    WBC 9.2 4.0 - 11.0 thou/uL    RBC 3.88 (L) 4.40 - 6.20 mill/uL    Hemoglobin 12.3 (L) 14.0 - 18.0 g/dL    Hematocrit 37.0 (L) 40.0 - 54.0 %    MCV 95 80 - 100 fL    MCH 31.6 27.0 - 34.0 pg    MCHC 33.1 32.0 - 36.0 g/dL    RDW 14.6 (H) 11.0 - 14.5 %    Platelets 10 (LL) 140 - 440 thou/uL    MPV 8.8 7.0 - 10.0 fL    Neutrophils % 82 (H) 50 - 70 %    Lymphocytes % 13 (L) 20 - 40 %    Monocytes % 5 2 - 10 %    Eosinophils % 0 0 - 6 %    Basophils % 0 0 - 2 %    Neutrophils Absolute 7.5 2.0 - 7.7 thou/uL    Lymphocytes Absolute 1.2 0.8 - 4.4 thou/uL    Monocytes Absolute 0.5 0.0 - 0.9 thou/uL    Eosinophils Absolute 0.0 0.0 - 0.4 thou/uL    Basophils Absolute 0.0 0.0 - 0.2 thou/uL   HM2(CBC w/o Differential)   Result Value Ref Range     WBC 14.3 (H) 4.0 - 11.0 thou/uL    RBC 3.64 (L) 4.40 - 6.20 mill/uL    Hemoglobin 11.5 (L) 14.0 - 18.0 g/dL    Hematocrit 35.1 (L) 40.0 - 54.0 %    MCV 97 80 - 100 fL    MCH 31.6 27.0 - 34.0 pg    MCHC 32.8 32.0 - 36.0 g/dL    RDW 14.9 (H) 11.0 - 14.5 %    Platelets 16 (LL) 140 - 440 thou/uL    MPV 9.8 7.0 - 10.0 fL   INR   Result Value Ref Range    INR 1.14 (H) 0.90 - 1.10   Platelet Count   Result Value Ref Range    Platelets 22 (LL) 140 - 440 thou/uL   Peripheral Blood Smear, Path Review   Result Value Ref Range    Case Report       Peripheral Blood Morphology                       Case: [id number]                                   Authorizing Provider:  Sade Smith MD              Collected:           02/10/2017 0606              Ordering Location:     [place] [address]     Received:            02/10/2017 0721              Pathologist:           Dwayne Shaikh MD                                                         Specimen:    Peripheral Blood                                                                           Final Diagnosis       PERIPHERAL BLOOD SMEAR:        - MARKED THROMBOCYTOPENIA       - NORMOCHROMIC-NORMOCYTIC ANEMIA        - BORDERLINE NEUTROPHILIA    Comment       The clinical history has been reviewed.     Thrombocytopenia is a nonspecific finding and may be due to multiple etiologies that cause decreased platelet production, increased platelet destruction by immune and by non-immune processes, and abnormal platelet pooling. Please correlate with clinical findings.     Normocytic anemia can be caused by multiple etiologies including intrinsic bone marrow disease, renal disease, hepatic disease, endocrine disease, anemia of chronic disease, post-hemorrhagic anemia, and bone marrow infiltration by tumors. Recommend clinical correlation and follow-up.     The causes of reactive neutrophilia are numerous and range from infection to metabolic defects. Bacterial infections are the  predominant cause of neutrophilia; other causes include therapeutic or endogenous drugs/hormones, acute stress, acute tissue necrosis and other infectious/inflammatory processes, including collagen vascular disorders and autoimmune disease. Absolute neutrophilia is seen in  a variety of hematopoietic neoplasms, especially myeloproliferative disorders. Recommend clinical correlation; consider repeat CBC after resolution of any possible infection. If a diagnosis of CML is suspected, evaluation of the peripheral blood for the presence of the Beltrami chromosome and/or the BCR/ABL fusion gene is suggested.    Peripheral Smear       Red blood cells are normal in number and overall normochromic and normocytic. Anisopoikilocytosis and polychromasia are slightly increased, but rouleaux formation does not appear prominent.    The white blood cell count and differential appear as reported on the CBC. Leukocytes are high normal in number and demonstrate a borderline neutrophilia. No blasts or dysplastic changes are identified.    Platelets are decreased in number but overall normal in appearance.    Charges CPT: 04482  ICD-10: D64.9          ASSESSMENT AND PLAN:    1. CAP (community acquired pneumonia)  2. Influenza A  3. Acute respiratory failure with hypoxia    [facility]  Chest x-ray showed a left lower lobe pneumonia    Treated with IV fluids, Levaquin, steroids  Treated with levaquin for LLL pneumonia  Treated with Tamiflu given influenza  Contineu Combivent inhaler    Strongly recommend that he quit tobacco    Given concern for COPD he will have the treatment then follow-up with pulmonology for further evaluation  Likely will need pulmonary function testing  - Ambulatory referral to Pulmonology      4. Thrombocytopenia, resolved    Likely secondary to infection  Will recheck white count in the future  - HM2(CBC w/o Differential)    5. Nicotine dependence    I have  prescribed the patient a tobacco cessation medication and the follow up will occur  at the next visit.   Counseled the patient for at least 4 minutes regarding tobacco cessation options  Prescribed the nicotine patch    Recommend follow-up for complete physical examination in the future    Checo Bermudez MD      This note has been dictated and transcribed using voice recognition software.  Any grammatical or contextual distortions are unintentional and inherent to the software.

## 2021-06-09 NOTE — PROGRESS NOTES
CCx: recent pneumonia and flu    HPI:   Ag Galdamez is a 71 year old male with relatively little medical history as he does not go to the physician very much.  Started to have a sudden onset of coughing, wheezing, congestion, chills, and fevers around Feb 8 or 9.  Just 2 weeks prior, he was changing axles on his car.  Admitted to the hospital, found to be influenza A positive and also felt to have pneumonia.  He was treated with levaquin and tamiflu.  States that after a week, he has noticed that feels much better, still has decreased energy and stamina, but rest of his symptoms have since resolved.  He has stairs in his basement along with an exercise machine.  Is able to go up and down the stairs and use his exercise machine without any difficulty.  No fevers or chills currently.  No shortness of breath currently.      Going to [place] next week for wine tasting and [place] for granddaughter.     Work History: , [employer] printing [employer]  Hobbies: lots of metal working  Pets: no  Tobacco Use: 50  Years, 1 ppd, quit 13 days ago  Home Environment: [place], 8 acre property,     PMH:  Past Medical History:   Diagnosis Date     Tobacco abuse        PSH:  Past Surgical History:   Procedure Laterality Date     HEMORRHOID SURGERY         SH:  Social History     Social History     Marital status:      Spouse name: N/A     Number of children: N/A     Years of education: N/A     Occupational History     Not on file.     Social History Main Topics     Smoking status: Former Smoker     Packs/day: 1.00     Years: 52.00     Types: Cigarettes     Quit date: 2/9/2017     Smokeless tobacco: Not on file     Alcohol use Yes      Comment: One drink before bed every night     Drug use: No     Sexual activity: Not on file     Other Topics Concern     Not on file     Social History Narrative     - wife here       Family history:  Dad - emphysema    ROS:  Review of Systems - History obtained from the  "patient  General ROS: negative  Psychological ROS: negative  ENT ROS: negative  Allergy and Immunology ROS: negative  Endocrine ROS: negative  Respiratory ROS: positive for - shortness of breath and wheezing  negative for - cough or sputum changes  Cardiovascular ROS: no chest pain or dyspnea on exertion  Gastrointestinal ROS: no abdominal pain, change in bowel habits, or black or bloody stools  Genito-Urinary ROS: no dysuria, trouble voiding, or hematuria  Musculoskeletal ROS: negative  Neurological ROS: no TIA or stroke symptoms  Dermatological ROS: negative      Current Meds:  Current Outpatient Prescriptions   Medication Sig     ascorbic acid, vitamin C, (ASCORBIC ACID WITH WILMAR HIPS) 500 MG tablet Take 500 mg by mouth daily.     ipratropium-albuterol (COMBIVENT RESPIMAT)  mcg/actuation Mist inhaler Inhale 1 puff 4 (four) times a day as needed (wheezing or SOB).     nicotine (NICODERM CQ) 21 mg/24 hr Place 1 patch on the skin daily. Pt states that he is not using the 21mg patch he is using the next step down       Labs:  No results found for this or any previous visit (from the past 72 hour(s)).    I have personally reviewed all imaging and PFT data available pertinent to this visit.    Imaging studies:  No results found.    PFTs:  none    Physical Exam:  Visit Vitals     /64     Pulse 94     Resp 18     Ht 5' 7.5\" (1.715 m)     Wt 127 lb 14.4 oz (58 kg)     SpO2 96%  Comment: RA     BMI 19.74 kg/m2     General - Well nourished  Ears/Mouth - TMs clear bilaterally,  OP pink moist, no thrush  Neck - no cervical lymphadenopathy  Lungs - Diminished throughout  CVS - regular rhythm with no murmurs, rubs or gallups  Abdomen - soft, NT, ND, NABS  Ext - no cyanosis, clubbing or edema  Skin - no rash  Psychology - alert and oriented, answers appropriate      Assessment and Plan:  1. COPD - Hyperinflation on CXR and heavy smoking history, certainly would be consistent with COPD, but do not have PFT.  Patient " doesn't complain of many symptoms.  Uses Combivent regularly because he was told to, but not necessarily because he needs it.  He otherwise feels well.  Advised that getting a PFT to evaluate his degree of obstruction would help in grading his severity and prognosis, but he was resistant.  Doesn't generally go to the doctor.  Last cigarette 2 weeks ago.  Congratulated on making a couple weeks without tobacco.  O2 sat good on room air.  Seems somewhat resistant to testing.  Would follow up in 3 months to ensure patient continues with smoking cessation, possible need for further testing or more therapy.    2. Recent influenza a and pneumonia. Treated with tamiflu and pneumonia.      F/U 3 months.      Matty Graham MD  Pulmonary and Critical Care Medicine  [phone]

## 2021-06-11 ENCOUNTER — HOSPITAL ENCOUNTER (OUTPATIENT)
Dept: RESPIRATORY THERAPY | Facility: CLINIC | Age: 76
Discharge: HOME | End: 2021-06-11
Attending: INTERNAL MEDICINE | Admitting: INTERNAL MEDICINE
Payer: MEDICARE

## 2021-06-11 DIAGNOSIS — J44.9 CHRONIC OBSTRUCTIVE PULMONARY DISEASE, UNSPECIFIED COPD TYPE (H): ICD-10-CM

## 2021-06-11 PROCEDURE — 94726 PLETHYSMOGRAPHY LUNG VOLUMES: CPT

## 2021-06-11 PROCEDURE — 94729 DIFFUSING CAPACITY: CPT

## 2021-06-11 PROCEDURE — 250N000009 HC RX 250: Performed by: FAMILY MEDICINE

## 2021-06-11 PROCEDURE — 999N000105 HC STATISTIC NO DOCUMENTATION TO SUPPORT CHARGE

## 2021-06-11 PROCEDURE — 94060 EVALUATION OF WHEEZING: CPT

## 2021-06-11 PROCEDURE — 94640 AIRWAY INHALATION TREATMENT: CPT

## 2021-06-11 RX ORDER — ALBUTEROL SULFATE 0.83 MG/ML
2.5 SOLUTION RESPIRATORY (INHALATION) ONCE
Status: COMPLETED | OUTPATIENT
Start: 2021-06-11 | End: 2021-06-11

## 2021-06-11 RX ADMIN — ALBUTEROL SULFATE 2.5 MG: 2.5 SOLUTION RESPIRATORY (INHALATION) at 11:35

## 2021-06-11 NOTE — LETTER
Evy 15, 2021      Ag Galdamez  [address]  [facility] [facility] [address]  [place] [address]        Dear ,    We are writing to inform you of your test results.  PFT showed severe COPD. This can explain your increased easy fatigability. Please schedule a virtual visit to discuss result and management further.   Please call [phone] to schedule this telephone visit.  You will also need to see your pulmonologist.     Resulted Orders   General PFT Lab (Please always keep checked)   Result Value Ref Range    FVC-Pred 3.56 L    FVC-Pre 1.82 L    FVC-%Pred-Pre 51 %    FEV1-Pre 0.82 L    FEV1-%Pred-Pre 30 %    FEV1FVC-Pred 76 %    FEV1FVC-Pre 45 %    FEFMax-Pred 6.88 L/sec    FEFMax-Pre 3.04 L/sec    FEFMax-%Pred-Pre 44 %    FEF2575-Pred 1.98 L/sec    FEF2575-Pre 0.28 L/sec    RZW7280-%Pred-Pre 14 %    FEF2575-Post 0.47 L/sec    TJF4335-%Pred-Post 23 %    ExpTime-Pre 9.18 sec    FIFMax-Pre 2.39 L/sec    VC-Pred 4.03 L    VC-Pre 2.28 L    VC-%Pred-Pre 56 %    IC-Pred 2.96 L    IC-Pre 1.81 L    IC-%Pred-Pre 61 %    ERV-Pred 1.07 L    ERV-Pre 0.47 L    ERV-%Pred-Pre 44 %    FEV1FEV6-Pred 77 %    FEV1FEV6-Pre 49 %    FRCPleth-Pred 3.57 L    FRCPleth-Pre 6.47 L    FRCPleth-%Pred-Pre 181 %    RVPleth-Pred 2.69 L    RVPleth-Pre 5.99 L    RVPleth-%Pred-Pre 223 %    TLCPleth-Pred 6.47 L    TLCPleth-Pre 8.28 L    TLCPleth-%Pred-Pre 128 %    DLCOunc-Pred 22.42 ml/min/mmHg    DLCOunc-Pre 12.30 ml/min/mmHg    DLCOunc-%Pred-Pre 54 %    VA-Pre 4.63 L    VA-%Pred-Pre 82 %    FEV1SVC-Pred 66 %    FEV1SVC-Pre 36 %    Narrative    The FVC, FEV1 and FEV1/FVC ratio are reduced.  The inspiratory flow rates are reduced.  The FVC is reduced relative to the SVC indicating air trapping.  The TLC, RV, FRC and RV/TLC ratio are all increased indicating overinflation and air trapping.    Following administration of bronchodilators, there is a significant response indicated by the increased FVC.  The diffusing capacity is  moderate-severely reduced. However, the diffusing capacity was not corrected for the patient's hemoglobin.  IMPRESSION:  Severe  Airflow Obstruction with reversibility and air trapping  Moderate - severe reduction in uncorrected DLCO  ____________________________________________M.D.    This interpretation has been electronically signed:  Nicolas Batista 06/12/2021  09:20:34 AM         If you have any questions or concerns, please call the clinic at the number listed above.       Sincerely,      Dago Stewart MD

## 2021-06-12 LAB
DLCOUNC-%PRED-PRE: 54 %
DLCOUNC-PRE: 12.3 ML/MIN/MMHG
DLCOUNC-PRED: 22.42 ML/MIN/MMHG
ERV-%PRED-PRE: 44 %
ERV-PRE: 0.47 L
ERV-PRED: 1.07 L
EXPTIME-PRE: 9.18 SEC
FEF2575-%PRED-POST: 23 %
FEF2575-%PRED-PRE: 14 %
FEF2575-POST: 0.47 L/SEC
FEF2575-PRE: 0.28 L/SEC
FEF2575-PRED: 1.98 L/SEC
FEFMAX-%PRED-PRE: 44 %
FEFMAX-PRE: 3.04 L/SEC
FEFMAX-PRED: 6.88 L/SEC
FEV1-%PRED-PRE: 30 %
FEV1-PRE: 0.82 L
FEV1FEV6-PRE: 49 %
FEV1FEV6-PRED: 77 %
FEV1FVC-PRE: 45 %
FEV1FVC-PRED: 76 %
FEV1SVC-PRE: 36 %
FEV1SVC-PRED: 66 %
FIFMAX-PRE: 2.39 L/SEC
FRCPLETH-%PRED-PRE: 181 %
FRCPLETH-PRE: 6.47 L
FRCPLETH-PRED: 3.57 L
FVC-%PRED-PRE: 51 %
FVC-PRE: 1.82 L
FVC-PRED: 3.56 L
IC-%PRED-PRE: 61 %
IC-PRE: 1.81 L
IC-PRED: 2.96 L
RVPLETH-%PRED-PRE: 223 %
RVPLETH-PRE: 5.99 L
RVPLETH-PRED: 2.69 L
TLCPLETH-%PRED-PRE: 128 %
TLCPLETH-PRE: 8.28 L
TLCPLETH-PRED: 6.47 L
VA-%PRED-PRE: 82 %
VA-PRE: 4.63 L
VC-%PRED-PRE: 56 %
VC-PRE: 2.28 L
VC-PRED: 4.03 L

## 2021-06-12 NOTE — PROGRESS NOTES
Wife Camila called with concern about his SOB, Has had increased SOB and is not feeling well.  Will start prednisone 20 mg X 7 days and Doxycycline Bid X seven days per Dr. Peres. Instructed to call on Monday with an update and if breathing does not improve or gets worse to go to the  ED to be evaluated.

## 2021-06-22 ENCOUNTER — VIRTUAL VISIT (OUTPATIENT)
Dept: FAMILY MEDICINE | Facility: CLINIC | Age: 76
End: 2021-06-22
Payer: MEDICARE

## 2021-06-22 DIAGNOSIS — R53.83 EASY FATIGABILITY: ICD-10-CM

## 2021-06-22 DIAGNOSIS — J44.9 COPD, SEVERE (H): Primary | ICD-10-CM

## 2021-06-22 DIAGNOSIS — I70.0 AORTIC CALCIFICATION (H): ICD-10-CM

## 2021-06-22 DIAGNOSIS — J44.9 COPD (CHRONIC OBSTRUCTIVE PULMONARY DISEASE) (H): Primary | ICD-10-CM

## 2021-06-22 PROCEDURE — 99214 OFFICE O/P EST MOD 30 MIN: CPT | Mod: 95 | Performed by: FAMILY MEDICINE

## 2021-06-22 RX ORDER — INHALER, ASSIST DEVICES
SPACER (EA) MISCELLANEOUS
Qty: 1 EACH | Refills: 0 | Status: SHIPPED | OUTPATIENT
Start: 2021-06-22

## 2021-06-22 RX ORDER — ALBUTEROL SULFATE 90 UG/1
2 AEROSOL, METERED RESPIRATORY (INHALATION) EVERY 4 HOURS PRN
Qty: 18 G | Refills: 11 | Status: SHIPPED | OUTPATIENT
Start: 2021-06-22 | End: 2024-04-16

## 2021-06-22 NOTE — PATIENT INSTRUCTIONS
Start advair as prescribed. This is your daily twice a day inhaler for maintenance treatment.    Albuterol inhaler 2 puffs every 4 hrs as needed for wheezing or coughing spells or tightness in breathing while active.    Schedule pulmonary medicine consult.    Be sure to pursue the CT scan of the chest as planned.  Likely cardiology consult will be needed if with significant coronary artery disease.    Patient Education   Advair Diskus Inhaler  Medicines: Fluticasone (ylaf-MCB-y-sone) and Salmeterol (sal-MIKAEL-ter-ahl)  What it does  This inhaler contains two medicines that relax the muscles in your airway. They also decrease inflammation, swelling and mucus.  Use every day to prevent symptoms, even if you feel well. Do not use for fast relief.     How to use this inhaler  1. Wash and dry your hands well.  2. Open the inhaler. Hold it in a level, flat position. Do not shake this inhaler.  3. Slide the lever from left to right until it clicks.  4. Exhale (breathe out) through mouth away from inhaler.  5. Place the mouthpiece into your mouth and close your lips tightly around it.  6. Keep the inhaler level while you inhale with a quick, deep breath.  7. Remove the mouthpiece and hold your breath for 10 seconds or as long as you can.  8. Breathe out through your mouth slowly and away from the inhaler. Close the inhaler.  This inhaler contains Salmeterol, a LABA class medicine.  Do not use with other LABA containing medicines.  9. Rinse your mouth or brush your teeth and spit out the water. Do not swallow.  Cleaning    Wipe the outside with dry tissue or cloth.    Do not wash any part of the inhaler. Keep dry.    Store inhaler in a cool, dry place.  How to tell if the inhaler is empty  The inhaler has no more doses when the counter reads 0. Numbers 5 to 0 are red to remind you to get a refill.  If you have questions about the use of your inhaler, please ask your pharmacist or provider.  For more information and video  ammy, go to [email or web address].  For informational purposes only. Not to replace the advice of your health care provider.  Copyright   2013 [facility]. All rights reserved. [employer] [id number] - REV 12/15.       Patient Education     What Is COPD?  COPD stands for chronic obstructive pulmonary disease. It means the airways in your lungs are blocked (obstructed). This makes it hard to breathe. You may have trouble with daily activities because of shortness of breath. Over time the shortness of breath often gets worse. This makes it harder to take care of yourself and take part in activities. Chronic bronchitis and emphysema are 2 common types of COPD.  How did I get COPD?  Most people get COPD from smoking. Cigarette smoke damages lungs. This can develop into COPD over many years.  How COPD affects you  COPD makes you work harder to breathe. Air may get trapped in the lungs. This prevents your lungs from filling completely with fresh oxygen-filled air when you breathe in (inhale). It's harder to take deep breaths, especially when you are active and start breathing faster. Over time your lungs may become enlarged, filled with air that does not transfer oxygen into the blood. These problems lead to shortness of breath (also called dyspnea). Hoarse, whistling breathing (wheezing) and a chronic cough are common. So is feeling tired and worn out (fatigue).   What happens in chronic bronchitis?    The cells in the airways make more mucus than normal. The mucus builds up, narrowing the airways. This means less air travels into and out of the lungs. The lining of the airways may also become swollen (inflamed). This causes the airways to narrow even more.  What happens in emphysema?    The small airways are damaged and lose their stretchiness. The airways collapse when you exhale. This causes air to get trapped in the air sacs. This means that less oxygen enters the blood vessels. And less oxygen is  delivered to all of the cells of your body. This makes it hard to breathe.  Damage to cilia    Cilia are small hairs that line and protect the airways. Smoking damages the cilia. Damaged cilia can t sweep mucus and particles away. Some of the cilia are destroyed. This damage makes COPD worse.  [employer] last reviewed this educational content on 8/1/2018 2000-2021 [employer]. All rights reserved. This information is not intended as a substitute for professional medical care. Always follow your healthcare professional's instructions.           Patient Education     Treating COPD       Your healthcare provider will prescribe the best treatments for your COPD.     Treatment  Treatments include:    Medicines. Some medicines help ease symptoms. Others control lung inflammation. Always take your medicines as prescribed. Learn the names of your medicines, and how and when to use them. Talk with your provider about other conditions you have and the medicines you take. When using a metered dose inhaler or nebulizer, use the correct techniques. If you have any questions about how to use your medicine delivery system, call your provider or refer to the user manual.    Tests. To monitor risks, your provider may advise a blood or sputum tests, or other lung function tests.  All people with COPD should be screened once for alpha-1 antitrypsin deficiency (AATD).    Oxygen therapy. If your blood contains too little oxygen, you may need oxygen therapy. Ask about long-term oxygen therapy with your provider.    Smoking. If you smoke, quit. Smoking is the main cause of COPD. Quitting will help you be able to better manage your COPD. Also don't use e-cigarettes or vaping products. Ask your provider to help you quit.    Preventing infections. Infections such as a cold or the flu can worsen your symptoms. Try to stay away from sick people. Wash your hands often. And ask your provider about vaccines for the flu and  pneumonia.    Surgery. In a few cases, surgery may be needed.   Coping with shortness of breath  Coping tips include:    Exercise. Be as active as you can. This will help your energy and strengthen your muscles so you can do more.    Breathing methods. Ask your provider or nurse to show you how to do pursed-lip breathing.    Pollution. Stay away from both indoor and outdoor pollution. Indoor pollution includes things like burning wood, smoke from home cooking, and heating fuels. Outdoor pollution includes things like dusts, vapors, fumes, gases, and other chemicals.    Balance rest and activity. Balance rest with activity. For example, you might start the day with getting dressed and eating breakfast. Then you can relax and read the paper. After that, take a brief walk. And then sit with your feet up for a while.    Pulmonary rehab.  Community and home-based programs work as well as hospital-based programs as long as they are done as often and as intensely. These programs help with shortness of breath in people with COPD. Supervised, traditional pulmonary rehab is the best option for people with COPD. These programs help manage your disease, breathing methods, exercise, support, and counseling. To find one, ask your provider or call your local hospital. Also talk with your provider about which program is best for you.    Healthy eating. Eating a healthy, balanced diet is alvares to staying as healthy as possible. So is staying at your ideal weight. Being over- or underweight can affect your health. Make sure you have a lot of fruits and vegetables every day. And also eat  whole grains, lean meats and fish, and low-fat dairy products.  [employer] last reviewed this educational content on 8/1/2018 2000-2021 [employer]. All rights reserved. This information is not intended as a substitute for professional medical care. Always follow your healthcare professional's instructions.

## 2021-06-22 NOTE — PROGRESS NOTES
Ag is a 76 year old who is being evaluated via a billable telephone visit.      What phone number would you like to be contacted at? [phone]  How would you like to obtain your AVS? Mail a copy    Assessment & Plan     COPD, severe (H)  Patient and spouse were advised of pathophysiology of COPD, its usual course, treatment and secondary prevention measures.  Discussed result of PFT. Due to severity, and patient being symptomatic, will start daily Advair.  Advised use of rescue inhaler.  pulm med referral due to severity and being symptomatic.  Reasons to go to ER discussed in detail with patient.  - PULMONARY MEDICINE REFERRAL  - fluticasone-salmeterol (ADVAIR) 100-50 MCG/DOSE inhaler  Dispense: 60 each; Refill: 1  - albuterol (PROAIR HFA/PROVENTIL HFA/VENTOLIN HFA) 108 (90 Base) MCG/ACT inhaler  Dispense: 18 g; Refill: 11  - spacer (OPTICHAMBER KINGSLEY) holding chamber  Dispense: 1 each; Refill: 0    Aortic calcification (H)  Awaiting CT coronary angio to assess coronary plaque burden.  Spouse said his CT had bee cancelled twice due to machine not working.    Easy fatigability  Awaiting CT coronary angio.  Will likely refer to cardiology as well after that. Suspect patient has CAD. Will consider lexiscan.  Patient has denied chest pain though, and his symptoms has been for several months.  More likely due to COPD.     Patient Instructions   Start advair as prescribed. This is your daily twice a day inhaler for maintenance treatment.    Albuterol inhaler 2 puffs every 4 hrs as needed for wheezing or coughing spells or tightness in breathing while active.    Schedule pulmonary medicine consult.    Be sure to pursue the CT scan of the chest as planned.  Likely cardiology consult will be needed if with significant coronary artery disease.    Patient Education   Advair Diskus Inhaler  Medicines: Fluticasone (toeu-NQD-i-sone) and Salmeterol (sal-MIKAEL-ter-ahl)  What it does  This inhaler contains two medicines that  relax the muscles in your airway. They also decrease inflammation, swelling and mucus.  Use every day to prevent symptoms, even if you feel well. Do not use for fast relief.     How to use this inhaler  1. Wash and dry your hands well.  2. Open the inhaler. Hold it in a level, flat position. Do not shake this inhaler.  3. Slide the lever from left to right until it clicks.  4. Exhale (breathe out) through mouth away from inhaler.  5. Place the mouthpiece into your mouth and close your lips tightly around it.  6. Keep the inhaler level while you inhale with a quick, deep breath.  7. Remove the mouthpiece and hold your breath for 10 seconds or as long as you can.  8. Breathe out through your mouth slowly and away from the inhaler. Close the inhaler.  This inhaler contains Salmeterol, a LABA class medicine.  Do not use with other LABA containing medicines.  9. Rinse your mouth or brush your teeth and spit out the water. Do not swallow.  Cleaning    Wipe the outside with dry tissue or cloth.    Do not wash any part of the inhaler. Keep dry.    Store inhaler in a cool, dry place.  How to tell if the inhaler is empty  The inhaler has no more doses when the counter reads 0. Numbers 5 to 0 are red to remind you to get a refill.  If you have questions about the use of your inhaler, please ask your pharmacist or provider.  For more information and video demos, go to [email or web address].  For informational purposes only. Not to replace the advice of your health care provider.  Copyright   2013 [facility]. All rights reserved. [employer] [id number] - REV 12/15.       Patient Education     What Is COPD?  COPD stands for chronic obstructive pulmonary disease. It means the airways in your lungs are blocked (obstructed). This makes it hard to breathe. You may have trouble with daily activities because of shortness of breath. Over time the shortness of breath often gets worse. This makes it harder to take care of  yourself and take part in activities. Chronic bronchitis and emphysema are 2 common types of COPD.  How did I get COPD?  Most people get COPD from smoking. Cigarette smoke damages lungs. This can develop into COPD over many years.  How COPD affects you  COPD makes you work harder to breathe. Air may get trapped in the lungs. This prevents your lungs from filling completely with fresh oxygen-filled air when you breathe in (inhale). It's harder to take deep breaths, especially when you are active and start breathing faster. Over time your lungs may become enlarged, filled with air that does not transfer oxygen into the blood. These problems lead to shortness of breath (also called dyspnea). Hoarse, whistling breathing (wheezing) and a chronic cough are common. So is feeling tired and worn out (fatigue).   What happens in chronic bronchitis?    The cells in the airways make more mucus than normal. The mucus builds up, narrowing the airways. This means less air travels into and out of the lungs. The lining of the airways may also become swollen (inflamed). This causes the airways to narrow even more.  What happens in emphysema?    The small airways are damaged and lose their stretchiness. The airways collapse when you exhale. This causes air to get trapped in the air sacs. This means that less oxygen enters the blood vessels. And less oxygen is delivered to all of the cells of your body. This makes it hard to breathe.  Damage to cilia    Cilia are small hairs that line and protect the airways. Smoking damages the cilia. Damaged cilia can t sweep mucus and particles away. Some of the cilia are destroyed. This damage makes COPD worse.  [employer] last reviewed this educational content on 8/1/2018 2000-2021 [employer]. All rights reserved. This information is not intended as a substitute for professional medical care. Always follow your healthcare professional's instructions.           Patient Education      Treating COPD       Your healthcare provider will prescribe the best treatments for your COPD.     Treatment  Treatments include:    Medicines. Some medicines help ease symptoms. Others control lung inflammation. Always take your medicines as prescribed. Learn the names of your medicines, and how and when to use them. Talk with your provider about other conditions you have and the medicines you take. When using a metered dose inhaler or nebulizer, use the correct techniques. If you have any questions about how to use your medicine delivery system, call your provider or refer to the user manual.    Tests. To monitor risks, your provider may advise a blood or sputum tests, or other lung function tests.  All people with COPD should be screened once for alpha-1 antitrypsin deficiency (AATD).    Oxygen therapy. If your blood contains too little oxygen, you may need oxygen therapy. Ask about long-term oxygen therapy with your provider.    Smoking. If you smoke, quit. Smoking is the main cause of COPD. Quitting will help you be able to better manage your COPD. Also don't use e-cigarettes or vaping products. Ask your provider to help you quit.    Preventing infections. Infections such as a cold or the flu can worsen your symptoms. Try to stay away from sick people. Wash your hands often. And ask your provider about vaccines for the flu and pneumonia.    Surgery. In a few cases, surgery may be needed.   Coping with shortness of breath  Coping tips include:    Exercise. Be as active as you can. This will help your energy and strengthen your muscles so you can do more.    Breathing methods. Ask your provider or nurse to show you how to do pursed-lip breathing.    Pollution. Stay away from both indoor and outdoor pollution. Indoor pollution includes things like burning wood, smoke from home cooking, and heating fuels. Outdoor pollution includes things like dusts, vapors, fumes, gases, and other chemicals.    Balance rest  and activity. Balance rest with activity. For example, you might start the day with getting dressed and eating breakfast. Then you can relax and read the paper. After that, take a brief walk. And then sit with your feet up for a while.    Pulmonary rehab.  Community and home-based programs work as well as hospital-based programs as long as they are done as often and as intensely. These programs help with shortness of breath in people with COPD. Supervised, traditional pulmonary rehab is the best option for people with COPD. These programs help manage your disease, breathing methods, exercise, support, and counseling. To find one, ask your provider or call your local hospital. Also talk with your provider about which program is best for you.    Healthy eating. Eating a healthy, balanced diet is alvares to staying as healthy as possible. So is staying at your ideal weight. Being over- or underweight can affect your health. Make sure you have a lot of fruits and vegetables every day. And also eat  whole grains, lean meats and fish, and low-fat dairy products.  [employer] last reviewed this educational content on 8/1/2018 2000-2021 [employer]. All rights reserved. This information is not intended as a substitute for professional medical care. Always follow your healthcare professional's instructions.               Return in about 1 month (around 7/22/2021) for Virtual visit for advair follow up if you have not seen pulmonary medicine specialist yet.    Dago Stewart MD  [facility]    Chris Luong is a 76 year old who presents for the following health issues accompanied by his spouse:    HPI     Chief Complaint   Patient presents with     Results     Discuss Results of PFT's Done Recently      Narrative    History from last visit:  Patient reports easy fatigability stable since December 2020.   Has known COPD for years - no  Inhalers.  No known CAD.    The FVC, FEV1 and  FEV1/FVC ratio are reduced.  The inspiratory flow rates are reduced.  The FVC is reduced relative to the SVC indicating air trapping.  The TLC, RV, FRC and RV/TLC ratio are all increased indicating overinflation and air trapping.     Following administration of bronchodilators, there is a significant response indicated by the increased FVC.  The diffusing capacity is moderate-severely reduced. However, the diffusing capacity was not corrected for the patient's hemoglobin.   IMPRESSION:   Severe  Airflow Obstruction with reversibility and air trapping   Moderate - severe reduction in uncorrected DLCO   ____________________________________________M.D.        Patient Active Problem List   Diagnosis     Tobacco abuse     CARDIOVASCULAR SCREENING; LDL GOAL LESS THAN 130     Vaccination refused by patient     Chronic obstructive pulmonary disease, unspecified COPD type (H)     Uncontrolled hypertension     Past Surgical History:   Procedure Laterality Date     HEMORRHOIDECTOMY  1963       Social History     Tobacco Use     Smoking status: Former Smoker     Packs/day: 1.00     Types: Cigarettes     Smokeless tobacco: Never Used   Substance Use Topics     Alcohol use: Yes     Comment: occ     Family History   Problem Relation Age of Onset     Heart Disease Father          Current Outpatient Medications   Medication Sig Dispense Refill     aspirin-acetaminophen-caffeine (EXCEDRIN MIGRAINE) 250-250-65 MG per tablet Take 2 tablets by mouth every 8 hours as needed for headaches       lisinopril (ZESTRIL) 2.5 MG tablet Take 1 tablet (2.5 mg) by mouth daily 90 tablet 0     multivitamin w/minerals (MULTI-VITAMIN) tablet Take 1 tablet by mouth daily       No Known Allergies  Review of Systems   Constitutional, HEENT, cardiovascular, pulmonary, GI, , musculoskeletal, neuro, skin, endocrine and psych systems are negative, except as otherwise noted.      Objective           Vitals:  No vitals were obtained today due to virtual  visit.    Physical Exam   alert and no distress  PSYCH: Alert and oriented times 3; coherent speech, normal   rate and volume, able to articulate logical thoughts, able   to abstract reason, no tangential thoughts, no hallucinations   or delusions  His affect is normal  RESP: No cough, no audible wheezing, able to talk in full sentences  Remainder of exam unable to be completed due to telephone visits    Hospital Outpatient Visit on 06/11/2021   Component Date Value Ref Range Status     FVC-Pred 06/11/2021 3.56  L Final     FVC-Pre 06/11/2021 1.82  L Final     FVC-%Pred-Pre 06/11/2021 51  % Final     FEV1-Pre 06/11/2021 0.82  L Final     FEV1-%Pred-Pre 06/11/2021 30  % Final     FEV1FVC-Pred 06/11/2021 76  % Final     FEV1FVC-Pre 06/11/2021 45  % Final     FEFMax-Pred 06/11/2021 6.88  L/sec Final     FEFMax-Pre 06/11/2021 3.04  L/sec Final     FEFMax-%Pred-Pre 06/11/2021 44  % Final     FEF2575-Pred 06/11/2021 1.98  L/sec Final     FEF2575-Pre 06/11/2021 0.28  L/sec Final     QBF7261-%Pred-Pre 06/11/2021 14  % Final     FEF2575-Post 06/11/2021 0.47  L/sec Final     ARV6082-%Pred-Post 06/11/2021 23  % Final     ExpTime-Pre 06/11/2021 9.18  sec Final     FIFMax-Pre 06/11/2021 2.39  L/sec Final     VC-Pred 06/11/2021 4.03  L Final     VC-Pre 06/11/2021 2.28  L Final     VC-%Pred-Pre 06/11/2021 56  % Final     IC-Pred 06/11/2021 2.96  L Final     IC-Pre 06/11/2021 1.81  L Final     IC-%Pred-Pre 06/11/2021 61  % Final     ERV-Pred 06/11/2021 1.07  L Final     ERV-Pre 06/11/2021 0.47  L Final     ERV-%Pred-Pre 06/11/2021 44  % Final     FEV1FEV6-Pred 06/11/2021 77  % Final     FEV1FEV6-Pre 06/11/2021 49  % Final     FRCPleth-Pred 06/11/2021 3.57  L Final     FRCPleth-Pre 06/11/2021 6.47  L Final     FRCPleth-%Pred-Pre 06/11/2021 181  % Final     RVPleth-Pred 06/11/2021 2.69  L Final     RVPleth-Pre 06/11/2021 5.99  L Final     RVPleth-%Pred-Pre 06/11/2021 223  % Final     TLCPleth-Pred 06/11/2021 6.47  L Final      TLCPleth-Pre 06/11/2021 8.28  L Final     TLCPleth-%Pred-Pre 06/11/2021 128  % Final     DLCOunc-Pred 06/11/2021 22.42  ml/min/mmHg Final     DLCOunc-Pre 06/11/2021 12.30  ml/min/mmHg Final     DLCOunc-%Pred-Pre 06/11/2021 54  % Final     VA-Pre 06/11/2021 4.63  L Final     VA-%Pred-Pre 06/11/2021 82  % Final     FEV1SVC-Pred 06/11/2021 66  % Final     FEV1SVC-Pre 06/11/2021 36  % Final       Phone call duration: 20 minutes

## 2021-06-23 ENCOUNTER — TELEPHONE (OUTPATIENT)
Dept: PULMONOLOGY | Facility: CLINIC | Age: 76
End: 2021-06-23

## 2021-06-23 NOTE — TELEPHONE ENCOUNTER
Left message for patient about scheduling chest xray prior to visit with Dr. Courtney in September. In addition to no longer needing the PFT prior to visit with Dr. Courtney (just chest xray). Left direct call back numbers to schedule.

## 2021-06-24 NOTE — TELEPHONE ENCOUNTER
RECORDS RECEIVED FROM: internal    DATE RECEIVED: 9.15.21    NOTES STATUS DETAILS   OFFICE NOTE from referring provider Dago Martin   OFFICE NOTE from other specialist gianna    DISCHARGE SUMMARY from hospital na    DISCHARGE REPORT from the ER na    MEDICATION LIST internal     IMAGING  (NEED IMAGES AND REPORTS)     CT SCAN na    CHEST XRAY (CXR) internal / in process  5.19.21    TESTS     PULMONARY FUNCTION TESTING (PFT) internal  6.11.21        Action [id number]    Action Taken Message sent to CC pool to help schedule CXR order =

## 2021-06-28 ENCOUNTER — TELEPHONE (OUTPATIENT)
Dept: PULMONOLOGY | Facility: CLINIC | Age: 76
End: 2021-06-28

## 2021-06-28 NOTE — TELEPHONE ENCOUNTER
Left message for patient about scheduling chest xray prior to visit with Dr. Courtney in September. Left direct call back numbers to schedule. Patient is not active on [employer] .

## 2021-07-02 ENCOUNTER — TELEPHONE (OUTPATIENT)
Dept: PULMONOLOGY | Facility: CLINIC | Age: 76
End: 2021-07-02

## 2021-07-02 NOTE — TELEPHONE ENCOUNTER
Spoke with patient about scheduling chest xray prior to visit with Dr. Courtney in September. Patient was not able to schedule at the moment and will call back to schedule when he has time. Left direct call back number to schedule.

## 2021-07-09 ENCOUNTER — TELEPHONE (OUTPATIENT)
Dept: PULMONOLOGY | Facility: CLINIC | Age: 76
End: 2021-07-09

## 2021-07-09 NOTE — TELEPHONE ENCOUNTER
RECORDS STATUS - ALL OTHER DIAGNOSIS      RECORDS RECEIVED FROM: [facility]   DATE RECEIVED: 7/14/2021   NOTES STATUS DETAILS   OFFICE NOTE from referring provider Dago Baca MD   OFFICE NOTE from medical oncologist N/A    DISCHARGE SUMMARY from hospital N/A    DISCHARGE REPORT from the ER     OPERATIVE REPORT N/A 6/11/2021 General PFT   MEDICATION LIST Complete [facility]   CLINICAL TRIAL TREATMENTS TO DATE     LABS     PATHOLOGY REPORTS     ANYTHING RELATED TO DIAGNOSIS Complete Labs last updated on 9/14/2021   GENONOMIC TESTING     TYPE:     IMAGING (NEED IMAGES & REPORT)     CT SCANS     MRI     MAMMO     ULTRASOUND     PET

## 2021-07-09 NOTE — TELEPHONE ENCOUNTER
Spoke with patient about scheduling chest xray prior to visit with Dr. Courtney in September. Details confirmed.

## 2021-07-14 ENCOUNTER — PRE VISIT (OUTPATIENT)
Dept: ONCOLOGY | Facility: CLINIC | Age: 76
End: 2021-07-14

## 2021-07-14 ENCOUNTER — ONCOLOGY VISIT (OUTPATIENT)
Dept: ONCOLOGY | Facility: CLINIC | Age: 76
End: 2021-07-14
Attending: INTERNAL MEDICINE
Payer: MEDICARE

## 2021-07-14 ENCOUNTER — LAB (OUTPATIENT)
Dept: LAB | Facility: CLINIC | Age: 76
End: 2021-07-14
Payer: MEDICARE

## 2021-07-14 VITALS
SYSTOLIC BLOOD PRESSURE: 140 MMHG | WEIGHT: 136.8 LBS | DIASTOLIC BLOOD PRESSURE: 76 MMHG | HEIGHT: 66 IN | HEART RATE: 95 BPM | RESPIRATION RATE: 18 BRPM | OXYGEN SATURATION: 97 % | BODY MASS INDEX: 21.98 KG/M2 | TEMPERATURE: 96.4 F

## 2021-07-14 DIAGNOSIS — J44.9 CHRONIC OBSTRUCTIVE PULMONARY DISEASE, UNSPECIFIED COPD TYPE (H): ICD-10-CM

## 2021-07-14 DIAGNOSIS — D69.6 THROMBOCYTOPENIA (H): Primary | ICD-10-CM

## 2021-07-14 DIAGNOSIS — D69.6 THROMBOCYTOPENIA (H): ICD-10-CM

## 2021-07-14 DIAGNOSIS — I10 UNCONTROLLED HYPERTENSION: ICD-10-CM

## 2021-07-14 LAB
ALBUMIN SERPL-MCNC: 3.9 G/DL (ref 3.4–5)
ALP SERPL-CCNC: 70 U/L (ref 40–150)
ALT SERPL W P-5'-P-CCNC: 20 U/L (ref 0–70)
ANION GAP SERPL CALCULATED.3IONS-SCNC: 6 MMOL/L (ref 3–14)
AST SERPL W P-5'-P-CCNC: 20 U/L (ref 0–45)
BASOPHILS # BLD AUTO: 0.1 10E3/UL (ref 0–0.2)
BASOPHILS NFR BLD AUTO: 2 %
BILIRUB SERPL-MCNC: 0.7 MG/DL (ref 0.2–1.3)
BUN SERPL-MCNC: 16 MG/DL (ref 7–30)
CALCIUM SERPL-MCNC: 9.3 MG/DL (ref 8.5–10.1)
CHLORIDE BLD-SCNC: 104 MMOL/L (ref 94–109)
CO2 SERPL-SCNC: 29 MMOL/L (ref 20–32)
CREAT SERPL-MCNC: 1.01 MG/DL (ref 0.66–1.25)
EOSINOPHIL # BLD AUTO: 0.2 10E3/UL (ref 0–0.7)
EOSINOPHIL NFR BLD AUTO: 3 %
ERYTHROCYTE [DISTWIDTH] IN BLOOD BY AUTOMATED COUNT: 14.1 % (ref 10–15)
GFR SERPL CREATININE-BSD FRML MDRD: 71 ML/MIN/1.73M2
GLUCOSE BLD-MCNC: 93 MG/DL (ref 70–99)
HCT VFR BLD AUTO: 43.3 % (ref 40–53)
HGB BLD-MCNC: 14.6 G/DL (ref 13.3–17.7)
IMM GRANULOCYTES # BLD: 0 10E3/UL
IMM GRANULOCYTES NFR BLD: 0 %
LYMPHOCYTES # BLD AUTO: 1.8 10E3/UL (ref 0.8–5.3)
LYMPHOCYTES NFR BLD AUTO: 26 %
MCH RBC QN AUTO: 32.4 PG (ref 26.5–33)
MCHC RBC AUTO-ENTMCNC: 33.7 G/DL (ref 31.5–36.5)
MCV RBC AUTO: 96 FL (ref 78–100)
MONOCYTES # BLD AUTO: 0.8 10E3/UL (ref 0–1.3)
MONOCYTES NFR BLD AUTO: 12 %
NEUTROPHILS # BLD AUTO: 3.9 10E3/UL (ref 1.6–8.3)
NEUTROPHILS NFR BLD AUTO: 57 %
NRBC # BLD AUTO: 0 10E3/UL
NRBC BLD AUTO-RTO: 0 /100
PLATELET # BLD AUTO: 71 10E3/UL (ref 150–450)
POTASSIUM BLD-SCNC: 4.5 MMOL/L (ref 3.4–5.3)
PROT SERPL-MCNC: 8 G/DL (ref 6.8–8.8)
RBC # BLD AUTO: 4.5 10E6/UL (ref 4.4–5.9)
RETICS # AUTO: 0.05 10E6/UL (ref 0.03–0.1)
RETICS/RBC NFR AUTO: 1.2 % (ref 0.5–2)
SODIUM SERPL-SCNC: 139 MMOL/L (ref 133–144)
WBC # BLD AUTO: 6.8 10E3/UL (ref 4–11)

## 2021-07-14 PROCEDURE — 36415 COLL VENOUS BLD VENIPUNCTURE: CPT

## 2021-07-14 PROCEDURE — 88313 SPECIAL STAINS GROUP 2: CPT | Mod: TC

## 2021-07-14 PROCEDURE — 82746 ASSAY OF FOLIC ACID SERUM: CPT

## 2021-07-14 PROCEDURE — G0463 HOSPITAL OUTPT CLINIC VISIT: HCPCS

## 2021-07-14 PROCEDURE — 82607 VITAMIN B-12: CPT

## 2021-07-14 PROCEDURE — 87389 HIV-1 AG W/HIV-1&-2 AB AG IA: CPT

## 2021-07-14 PROCEDURE — 85045 AUTOMATED RETICULOCYTE COUNT: CPT | Performed by: INTERNAL MEDICINE

## 2021-07-14 PROCEDURE — 86803 HEPATITIS C AB TEST: CPT

## 2021-07-14 PROCEDURE — 99204 OFFICE O/P NEW MOD 45 MIN: CPT | Performed by: INTERNAL MEDICINE

## 2021-07-14 PROCEDURE — 85025 COMPLETE CBC W/AUTO DIFF WBC: CPT | Performed by: INTERNAL MEDICINE

## 2021-07-14 PROCEDURE — 86038 ANTINUCLEAR ANTIBODIES: CPT | Performed by: INTERNAL MEDICINE

## 2021-07-14 PROCEDURE — 82040 ASSAY OF SERUM ALBUMIN: CPT

## 2021-07-14 RX ORDER — ASCORBIC ACID 500 MG
500 TABLET ORAL
COMMUNITY

## 2021-07-14 ASSESSMENT — MIFFLIN-ST. JEOR: SCORE: 1292.24

## 2021-07-14 ASSESSMENT — PAIN SCALES - GENERAL: PAINLEVEL: NO PAIN (0)

## 2021-07-14 NOTE — LETTER
"    7/14/2021         RE: Ag Galdamez  [address]   [address]  [facility] [id number]        Dear Colleague,    Thank you for referring your patient, Ag Galdamez, to the [facility]. Please see a copy of my visit note below.    Oncology Rooming Note    July 14, 2021 10:01 AM   Ag Galdamez is a 76 year old male who presents for:    Chief Complaint   Patient presents with     Hematology     New, Thrombocytopenia.      Initial Vitals: BP (!) 140/76 (BP Location: Right arm, Patient Position: Sitting, Cuff Size: Adult Large)   Pulse 95   Temp (!) 96.4  F (35.8  C) (Oral)   Resp 18   Ht 1.683 m (5' 6.25\")   Wt 62.1 kg (136 lb 12.8 oz)   SpO2 97%   BMI 21.91 kg/m   Estimated body mass index is 21.91 kg/m  as calculated from the following:    Height as of this encounter: 1.683 m (5' 6.25\").    Weight as of this encounter: 62.1 kg (136 lb 12.8 oz). Body surface area is 1.7 meters squared.  No Pain (0) Comment: Data Unavailable   No LMP for male patient.  Allergies reviewed: Yes  Medications reviewed: Yes    Medications: Medication refills not needed today.  Pharmacy name entered into [facility]: [id number] [place] [address]    Clinical concerns:  New, Thrombocytopenia.     Farnaz Candelario CMA              DATE OF VISIT: Jul 14, 2021    REASON FOR REFERRAL: Thrombocytopenia    CHIEF COMPLAINT:   Chief Complaint   Patient presents with     Hematology     New, Thrombocytopenia.        HISTORY OF PRESENT ILLNESS:   This is a 76-year-old male patient was referred today for evaluation for thrombocytopenia.  He is known to have thrombocytopenia since 2017.  At this time he was hospitalized with a diagnosis of pneumonia.  Platelet count was 10,000.  At that time it was felt his thrombocytopenia is related to infection.  I am not sure if he was investigated before.  However, I do not see any work-up for thrombocytopenia from last hospitalization.  " He was found to have a platelet count of 62,000 on May 19, 2021 and was evaluated for evaluation for thrombocytopenia.  He denies any bruising or bleeding.  He denies any fever or chills or night sweats.  He denies any shortness of breath or cough or wheezing.  He denies any skin rash or joint aches or pains    REVIEW OF SYSTEMS:   Constitutional: Negative for fever, chills, and night sweats.  Skin: negative.  Eyes: negative.  Ears/Nose/Throat: negative.  Respiratory: No shortness of breath, dyspnea on exertion, cough, or hemoptysis.  Cardiovascular: negative.  Gastrointestinal: negative.  Genitourinary: negative.  Musculoskeletal: negative.  Neurologic: negative.  Psychiatric: negative.  Hematologic/Lymphatic/Immunologic: negative.  Endocrine: negative.    PAST MEDICAL HISTORY:   No past medical history on file.    PAST SURGICAL HISTORY:   Past Surgical History:   Procedure Laterality Date     HEMORRHOID SURGERY       HEMORRHOIDECTOMY  1963       ALLERGIES:   Allergies as of 07/14/2021     (No Known Allergies)       MEDICATIONS:   Current Outpatient Medications   Medication Sig Dispense Refill     albuterol (PROAIR HFA/PROVENTIL HFA/VENTOLIN HFA) 108 (90 Base) MCG/ACT inhaler Inhale 2 puffs into the lungs every 4 hours as needed for shortness of breath / dyspnea 18 g 11     fluticasone-salmeterol (ADVAIR) 100-50 MCG/DOSE inhaler Inhale 1 puff into the lungs every 12 hours 60 each 1     lisinopril (ZESTRIL) 2.5 MG tablet Take 1 tablet (2.5 mg) by mouth daily 90 tablet 0     multivitamin w/minerals (MULTI-VITAMIN) tablet Take 1 tablet by mouth daily       spacer (OPTICHAMBER KINGSLEY) holding chamber Use with inhaler 1 each 0     vitamin C (ASCORBIC ACID) 500 MG tablet Take 500 mg by mouth       aspirin-acetaminophen-caffeine (EXCEDRIN MIGRAINE) 250-250-65 MG per tablet Take 2 tablets by mouth every 8 hours as needed for headaches (Patient not taking: Reported on 7/14/2021)          FAMILY HISTORY:   Family History  "  Problem Relation Age of Onset     Heart Disease Father       There is no history of rheumatologic disease in his family    SOCIAL HISTORY:   Social History     Socioeconomic History     Marital status:      Spouse name: None     Number of children: None     Years of education: None     Highest education level: None   Occupational History     None   Tobacco Use     Smoking status: Former Smoker     Packs/day: 1.00     Types: Cigarettes     Smokeless tobacco: Never Used   Substance and Sexual Activity     Alcohol use: Yes     Comment: occ     Drug use: No     Sexual activity: Not Currently   Other Topics Concern     Parent/sibling w/ CABG, MI or angioplasty before 65F 55M? Not Asked   Social History Narrative     None     Social Determinants of Health     Financial Resource Strain:      Difficulty of Paying Living Expenses:    Food Insecurity:      Worried About Running Out of Food in the Last Year:      Ran Out of Food in the Last Year:    Transportation Needs:      Lack of Transportation (Medical):      Lack of Transportation (Non-Medical):    Physical Activity:      Days of Exercise per Week:      Minutes of Exercise per Session:    Stress:      Feeling of Stress :    Social Connections:      Frequency of Communication with Friends and Family:      Frequency of Social Gatherings with Friends and Family:      Attends Voodoo Services:      Active Member of Clubs or Organizations:      Attends Club or Organization Meetings:      Marital Status:    Intimate Partner Violence:      Fear of Current or Ex-Partner:      Emotionally Abused:      Physically Abused:      Sexually Abused:        PHYSICAL EXAMINATION:   BP (!) 140/76 (BP Location: Right arm, Patient Position: Sitting, Cuff Size: Adult Large)   Pulse 95   Temp (!) 96.4  F (35.8  C) (Oral)   Resp 18   Ht 1.683 m (5' 6.25\")   Wt 62.1 kg (136 lb 12.8 oz)   SpO2 97%   BMI 21.91 kg/m    Wt Readings from Last 10 Encounters:   07/14/21 62.1 kg (136 lb " 12.8 oz)   05/26/21 64.1 kg (141 lb 6.4 oz)   05/19/21 63.4 kg (139 lb 12.8 oz)   02/22/17 58 kg (127 lb 14.4 oz)   02/17/17 58.5 kg (129 lb)   09/08/15 62.6 kg (138 lb)   07/21/11 62.6 kg (138 lb)   02/26/10 64.9 kg (143 lb)      GENERAL APPEARANCE: Healthy, alert and in no acute distress.  HEENT: Sclerae anicteric. PERRLA. Oropharynx without ulcers, lesions, or thrush.  NECK: Supple. No asymmetry or masses.  LYMPHATICS: No palpable cervical, supraclavicular, axillary, or inguinal lymphadenopathy.  RESP: Lungs clear to auscultation bilaterally without rales, rhonchi or wheezes.  CARDIOVASCULAR: Regular rate and rhythm. Normal S1, S2; no S3 or S4. No murmur, gallop, or rub.  ABDOMEN: Soft, nontender. Bowel sounds normal. No palpable organomegaly or masses.  MUSCULOSKELETAL: Extremities without gross deformities noted. No edema of bilateral lower extremities.  SKIN: No suspicious lesions or rashes.  NEURO: Alert and oriented x 3. Cranial nerves II-XII grossly intact.  PSYCHIATRIC: Mentation and affect appear normal.    LABORATORY RESULTS:  Lab on 07/14/2021   Component Date Value Ref Range Status     Sodium 07/14/2021 139  133 - 144 mmol/L Final     Potassium 07/14/2021 4.5  3.4 - 5.3 mmol/L Final     Chloride 07/14/2021 104  94 - 109 mmol/L Final     Carbon Dioxide (CO2) 07/14/2021 29  20 - 32 mmol/L Final     Anion Gap 07/14/2021 6  3 - 14 mmol/L Final     Urea Nitrogen 07/14/2021 16  7 - 30 mg/dL Final     Creatinine 07/14/2021 1.01  0.66 - 1.25 mg/dL Final     Calcium 07/14/2021 9.3  8.5 - 10.1 mg/dL Final     Glucose 07/14/2021 93  70 - 99 mg/dL Final     Alkaline Phosphatase 07/14/2021 70  40 - 150 U/L Final     AST 07/14/2021 20  0 - 45 U/L Final     ALT 07/14/2021 20  0 - 70 U/L Final     Protein Total 07/14/2021 8.0  6.8 - 8.8 g/dL Final     Albumin 07/14/2021 3.9  3.4 - 5.0 g/dL Final     Bilirubin Total 07/14/2021 0.7  0.2 - 1.3 mg/dL Final     GFR Estimate 07/14/2021 71  >60 mL/min/1.73m2 Final    As of  July 11, 2021, eGFR is calculated by the CKD-EPI creatinine equation, without race adjustment. eGFR can be influenced by muscle mass, exercise, and diet. The reported eGFR is an estimation only and is only applicable if the renal function is stable.         ASSESSMENT AND PLAN:  (D69.6) Thrombocytopenia (H)  (primary encounter diagnosis)  Patient is 76-year-old patient with a longstanding history of thrombocytopenia.  I reviewed with the patient today the differential diagnosis of thrombocytopenia.  Today we will arrange for work-up including Blood Morphology Pathologist Review, CBC with  platelets differential, Reticulocyte Count, Lactate Dehydrogenase, Anti Nuclear [place] IgG by IFA with Reflex, Hepatitis C antibody, HIV Antigen Antibody Combo, Comprehensive metabolic panel, Folate, US Abdomen Limited, Vitamin B12.  I will see him when these results are abnormal to discuss further management.    (I10) Uncontrolled hypertension  Blood pressure is elevated today.  Is currently on lisinopril 2.5 mg orally daily.    (J44.9) Chronic obstructive pulmonary disease, unspecified COPD type (H)  His symptoms has been controlled on Advair and albuterol.    The patient is ready to learn, no apparent learning barriers were identified, Diagnosis and treatment plans were explained to the patient. The patient expressed understanding of the content. The patient questions were answered to his satisfaction.    Michel Kendrick MD    Chart documentation with Dragon Voice recognition Software. Although reviewed after completion, some words and grammatical errors may remain.      Again, thank you for allowing me to participate in the care of your patient.        Sincerely,        Michel Kendrick MD

## 2021-07-14 NOTE — PROGRESS NOTES
"Oncology Rooming Note    July 14, 2021 10:01 AM   Ag Galdamez is a 76 year old male who presents for:    Chief Complaint   Patient presents with     Hematology     New, Thrombocytopenia.      Initial Vitals: BP (!) 140/76 (BP Location: Right arm, Patient Position: Sitting, Cuff Size: Adult Large)   Pulse 95   Temp (!) 96.4  F (35.8  C) (Oral)   Resp 18   Ht 1.683 m (5' 6.25\")   Wt 62.1 kg (136 lb 12.8 oz)   SpO2 97%   BMI 21.91 kg/m   Estimated body mass index is 21.91 kg/m  as calculated from the following:    Height as of this encounter: 1.683 m (5' 6.25\").    Weight as of this encounter: 62.1 kg (136 lb 12.8 oz). Body surface area is 1.7 meters squared.  No Pain (0) Comment: Data Unavailable   No LMP for male patient.  Allergies reviewed: Yes  Medications reviewed: Yes    Medications: Medication refills not needed today.  Pharmacy [facility] into [id number] [place] [address]    Clinical concerns:  New, Thrombocytopenia.     Farnaz Candelario, CMA            "

## 2021-07-14 NOTE — PROGRESS NOTES
DATE OF VISIT: Jul 14, 2021    REASON FOR REFERRAL: Thrombocytopenia    CHIEF COMPLAINT:   Chief Complaint   Patient presents with     Hematology     New, Thrombocytopenia.        HISTORY OF PRESENT ILLNESS:   This is a 76-year-old male patient was referred today for evaluation for thrombocytopenia.  He is known to have thrombocytopenia since 2017.  At this time he was hospitalized with a diagnosis of pneumonia.  Platelet count was 10,000.  At that time it was felt his thrombocytopenia is related to infection.  I am not sure if he was investigated before.  However, I do not see any work-up for thrombocytopenia from last hospitalization.  He was found to have a platelet count of 62,000 on May 19, 2021 and was evaluated for evaluation for thrombocytopenia.  He denies any bruising or bleeding.  He denies any fever or chills or night sweats.  He denies any shortness of breath or cough or wheezing.  He denies any skin rash or joint aches or pains    REVIEW OF SYSTEMS:   Constitutional: Negative for fever, chills, and night sweats.  Skin: negative.  Eyes: negative.  Ears/Nose/Throat: negative.  Respiratory: No shortness of breath, dyspnea on exertion, cough, or hemoptysis.  Cardiovascular: negative.  Gastrointestinal: negative.  Genitourinary: negative.  Musculoskeletal: negative.  Neurologic: negative.  Psychiatric: negative.  Hematologic/Lymphatic/Immunologic: negative.  Endocrine: negative.    PAST MEDICAL HISTORY:   No past medical history on file.    PAST SURGICAL HISTORY:   Past Surgical History:   Procedure Laterality Date     HEMORRHOID SURGERY       HEMORRHOIDECTOMY  1963       ALLERGIES:   Allergies as of 07/14/2021     (No Known Allergies)       MEDICATIONS:   Current Outpatient Medications   Medication Sig Dispense Refill     albuterol (PROAIR HFA/PROVENTIL HFA/VENTOLIN HFA) 108 (90 Base) MCG/ACT inhaler Inhale 2 puffs into the lungs every 4 hours as needed for shortness of breath / dyspnea 18 g 11      fluticasone-salmeterol (ADVAIR) 100-50 MCG/DOSE inhaler Inhale 1 puff into the lungs every 12 hours 60 each 1     lisinopril (ZESTRIL) 2.5 MG tablet Take 1 tablet (2.5 mg) by mouth daily 90 tablet 0     multivitamin w/minerals (MULTI-VITAMIN) tablet Take 1 tablet by mouth daily       spacer (OPTICHAMBER KINGLSEY) holding chamber Use with inhaler 1 each 0     vitamin C (ASCORBIC ACID) 500 MG tablet Take 500 mg by mouth       aspirin-acetaminophen-caffeine (EXCEDRIN MIGRAINE) 250-250-65 MG per tablet Take 2 tablets by mouth every 8 hours as needed for headaches (Patient not taking: Reported on 7/14/2021)          FAMILY HISTORY:   Family History   Problem Relation Age of Onset     Heart Disease Father       There is no history of rheumatologic disease in his family    SOCIAL HISTORY:   Social History     Socioeconomic History     Marital status:      Spouse name: None     Number of children: None     Years of education: None     Highest education level: None   Occupational History     None   Tobacco Use     Smoking status: Former Smoker     Packs/day: 1.00     Types: Cigarettes     Smokeless tobacco: Never Used   Substance and Sexual Activity     Alcohol use: Yes     Comment: occ     Drug use: No     Sexual activity: Not Currently   Other Topics Concern     Parent/sibling w/ CABG, MI or angioplasty before 65F 55M? Not Asked   Social History Narrative     None     Social Determinants of Health     Financial Resource Strain:      Difficulty of Paying Living Expenses:    Food Insecurity:      Worried About Running Out of Food in the Last Year:      Ran Out of Food in the Last Year:    Transportation Needs:      Lack of Transportation (Medical):      Lack of Transportation (Non-Medical):    Physical Activity:      Days of Exercise per Week:      Minutes of Exercise per Session:    Stress:      Feeling of Stress :    Social Connections:      Frequency of Communication with Friends and Family:      Frequency of Social  "Gatherings with Friends and Family:      Attends Mormon Services:      Active Member of Clubs or Organizations:      Attends Club or Organization Meetings:      Marital Status:    Intimate Partner Violence:      Fear of Current or Ex-Partner:      Emotionally Abused:      Physically Abused:      Sexually Abused:        PHYSICAL EXAMINATION:   BP (!) 140/76 (BP Location: Right arm, Patient Position: Sitting, Cuff Size: Adult Large)   Pulse 95   Temp (!) 96.4  F (35.8  C) (Oral)   Resp 18   Ht 1.683 m (5' 6.25\")   Wt 62.1 kg (136 lb 12.8 oz)   SpO2 97%   BMI 21.91 kg/m    Wt Readings from Last 10 Encounters:   07/14/21 62.1 kg (136 lb 12.8 oz)   05/26/21 64.1 kg (141 lb 6.4 oz)   05/19/21 63.4 kg (139 lb 12.8 oz)   02/22/17 58 kg (127 lb 14.4 oz)   02/17/17 58.5 kg (129 lb)   09/08/15 62.6 kg (138 lb)   07/21/11 62.6 kg (138 lb)   02/26/10 64.9 kg (143 lb)      GENERAL APPEARANCE: Healthy, alert and in no acute distress.  HEENT: Sclerae anicteric. PERRLA. Oropharynx without ulcers, lesions, or thrush.  NECK: Supple. No asymmetry or masses.  LYMPHATICS: No palpable cervical, supraclavicular, axillary, or inguinal lymphadenopathy.  RESP: Lungs clear to auscultation bilaterally without rales, rhonchi or wheezes.  CARDIOVASCULAR: Regular rate and rhythm. Normal S1, S2; no S3 or S4. No murmur, gallop, or rub.  ABDOMEN: Soft, nontender. Bowel sounds normal. No palpable organomegaly or masses.  MUSCULOSKELETAL: Extremities without gross deformities noted. No edema of bilateral lower extremities.  SKIN: No suspicious lesions or rashes.  NEURO: Alert and oriented x 3. Cranial nerves II-XII grossly intact.  PSYCHIATRIC: Mentation and affect appear normal.    LABORATORY RESULTS:  Lab on 07/14/2021   Component Date Value Ref Range Status     Sodium 07/14/2021 139  133 - 144 mmol/L Final     Potassium 07/14/2021 4.5  3.4 - 5.3 mmol/L Final     Chloride 07/14/2021 104  94 - 109 mmol/L Final     Carbon Dioxide (CO2) " 07/14/2021 29  20 - 32 mmol/L Final     Anion Gap 07/14/2021 6  3 - 14 mmol/L Final     Urea Nitrogen 07/14/2021 16  7 - 30 mg/dL Final     Creatinine 07/14/2021 1.01  0.66 - 1.25 mg/dL Final     Calcium 07/14/2021 9.3  8.5 - 10.1 mg/dL Final     Glucose 07/14/2021 93  70 - 99 mg/dL Final     Alkaline Phosphatase 07/14/2021 70  40 - 150 U/L Final     AST 07/14/2021 20  0 - 45 U/L Final     ALT 07/14/2021 20  0 - 70 U/L Final     Protein Total 07/14/2021 8.0  6.8 - 8.8 g/dL Final     Albumin 07/14/2021 3.9  3.4 - 5.0 g/dL Final     Bilirubin Total 07/14/2021 0.7  0.2 - 1.3 mg/dL Final     GFR Estimate 07/14/2021 71  >60 mL/min/1.73m2 Final    As of July 11, 2021, eGFR is calculated by the CKD-EPI creatinine equation, without race adjustment. eGFR can be influenced by muscle mass, exercise, and diet. The reported eGFR is an estimation only and is only applicable if the renal function is stable.         ASSESSMENT AND PLAN:  (D69.6) Thrombocytopenia (H)  (primary encounter diagnosis)  Patient is 76-year-old patient with a longstanding history of thrombocytopenia.  I reviewed with the patient today the differential diagnosis of thrombocytopenia.  Today we will arrange for work-up including Blood Morphology Pathologist Review, CBC with  platelets differential, Reticulocyte Count, Lactate Dehydrogenase, Anti Nuclear [place] IgG by IFA with Reflex, Hepatitis C antibody, HIV Antigen Antibody Combo, Comprehensive metabolic panel, Folate, US Abdomen Limited, Vitamin B12.  I will see him when these results are abnormal to discuss further management.    (I10) Uncontrolled hypertension  Blood pressure is elevated today.  Is currently on lisinopril 2.5 mg orally daily.    (J44.9) Chronic obstructive pulmonary disease, unspecified COPD type (H)  His symptoms has been controlled on Advair and albuterol.    The patient is ready to learn, no apparent learning barriers were identified, Diagnosis and treatment plans were explained to the  patient. The patient expressed understanding of the content. The patient questions were answered to his satisfaction.    Michel Kendrick MD    Chart documentation with Dragon Voice recognition Software. Although reviewed after completion, some words and grammatical errors may remain.

## 2021-07-15 LAB
FOLATE SERPL-MCNC: 31 NG/ML
HCV AB SERPL QL IA: NONREACTIVE
HIV 1+2 AB+HIV1 P24 AG SERPL QL IA: NONREACTIVE
VIT B12 SERPL-MCNC: 2119 PG/ML (ref 193–986)

## 2021-07-16 LAB
PATH REPORT.COMMENTS IMP SPEC: NORMAL
PATH REPORT.COMMENTS IMP SPEC: NORMAL
PATH REPORT.FINAL DX SPEC: NORMAL
PATH REPORT.MICROSCOPIC SPEC OTHER STN: NORMAL
PATH REPORT.MICROSCOPIC SPEC OTHER STN: NORMAL
PATH REPORT.RELEVANT HX SPEC: NORMAL

## 2021-07-16 PROCEDURE — 85060 BLOOD SMEAR INTERPRETATION: CPT | Performed by: PATHOLOGY

## 2021-07-21 LAB
ANA PAT SER IF-IMP: ABNORMAL
ANA SER QL IF: POSITIVE
ANA TITR SER IF: ABNORMAL {TITER}

## 2021-08-05 ENCOUNTER — TELEPHONE (OUTPATIENT)
Dept: FAMILY MEDICINE | Facility: CLINIC | Age: 76
End: 2021-08-05

## 2021-08-05 ENCOUNTER — HOSPITAL ENCOUNTER (OUTPATIENT)
Dept: CT IMAGING | Facility: CLINIC | Age: 76
End: 2021-08-05
Attending: FAMILY MEDICINE
Payer: MEDICARE

## 2021-08-05 ENCOUNTER — HOSPITAL ENCOUNTER (OUTPATIENT)
Dept: ULTRASOUND IMAGING | Facility: CLINIC | Age: 76
End: 2021-08-05
Attending: INTERNAL MEDICINE
Payer: MEDICARE

## 2021-08-05 DIAGNOSIS — I10 UNCONTROLLED HYPERTENSION: ICD-10-CM

## 2021-08-05 DIAGNOSIS — N28.89 LEFT KIDNEY MASS: Primary | ICD-10-CM

## 2021-08-05 DIAGNOSIS — D69.6 THROMBOCYTOPENIA (H): ICD-10-CM

## 2021-08-05 DIAGNOSIS — I70.0 AORTIC CALCIFICATION (H): ICD-10-CM

## 2021-08-05 DIAGNOSIS — R53.83 EASY FATIGABILITY: ICD-10-CM

## 2021-08-05 PROCEDURE — 75571 CT HRT W/O DYE W/CA TEST: CPT | Mod: MG,GZ

## 2021-08-05 PROCEDURE — 76700 US EXAM ABDOM COMPLETE: CPT

## 2021-08-05 PROCEDURE — G1004 CDSM NDSC: HCPCS | Performed by: INTERNAL MEDICINE

## 2021-08-05 PROCEDURE — 75571 CT HRT W/O DYE W/CA TEST: CPT | Mod: 26 | Performed by: INTERNAL MEDICINE

## 2021-08-05 NOTE — TELEPHONE ENCOUNTER
I'm confused- it does not appear that patient is scheduled with me tomorrow.  He does have an appointment with Dr. Kendrick tomorrow.  I imagine they would probably be able to do a dressing change there, assuming that is in person and not virtual.    Americo Contreras MD

## 2021-08-05 NOTE — TELEPHONE ENCOUNTER
Reason for Call: Request for an order:    Order being requested: Patient is coming in to see Dr. Contreras 8/6/21, home care would like dressing change. If not, they would like a delay and they will try to do it tomorrow.    Date needed: as soon as possible    Has the patient been seen by the PCP for this problem? YES    Additional comments: Patient is coming in to see Dr. Contreras 8/6/21, home care would like dressing change. If not, they would like a delay and they will try to do it tomorrow.    Phone number Patient can be reached at:  Other phone number:  Noelle [phone], Ext [phone]    Best Time:  Any    Can we leave a detailed message on this number?  YES    Call taken on 8/5/2021 at 3:53 PM by Alina Lanier

## 2021-08-05 NOTE — TELEPHONE ENCOUNTER
Forwarding below to PCP to advise.  Can patient have dressing change in the clinic tomorrow vs home care doing it?  Should patient bring her supplies?  Not sure what current orders are?    Debo Fenton RN  [facility]

## 2021-08-06 ENCOUNTER — ONCOLOGY VISIT (OUTPATIENT)
Dept: ONCOLOGY | Facility: CLINIC | Age: 76
End: 2021-08-06
Attending: INTERNAL MEDICINE
Payer: MEDICARE

## 2021-08-06 VITALS
HEIGHT: 67 IN | BODY MASS INDEX: 21.38 KG/M2 | DIASTOLIC BLOOD PRESSURE: 77 MMHG | WEIGHT: 136.2 LBS | RESPIRATION RATE: 18 BRPM | OXYGEN SATURATION: 94 % | SYSTOLIC BLOOD PRESSURE: 164 MMHG | TEMPERATURE: 98.6 F | HEART RATE: 94 BPM

## 2021-08-06 DIAGNOSIS — D69.6 THROMBOCYTOPENIA (H): Primary | ICD-10-CM

## 2021-08-06 PROCEDURE — 99213 OFFICE O/P EST LOW 20 MIN: CPT | Performed by: INTERNAL MEDICINE

## 2021-08-06 PROCEDURE — G0463 HOSPITAL OUTPT CLINIC VISIT: HCPCS

## 2021-08-06 ASSESSMENT — MIFFLIN-ST. JEOR: SCORE: 1293.49

## 2021-08-06 ASSESSMENT — PAIN SCALES - GENERAL: PAINLEVEL: NO PAIN (0)

## 2021-08-06 NOTE — TELEPHONE ENCOUNTER
It was determined that this was an erroneous encounter, so CSS designated as so and closing encounter.  It sounds like the correct patient was determined and documented appropriately.     Debo Fenton RN  [facility]

## 2021-08-06 NOTE — LETTER
"    8/6/2021         RE: Ag Galdamez  [address]   [address]  [facility] [id number]        Dear Colleague,    Thank you for referring your patient, Ag Galdamez, to the [facility]. Please see a copy of my visit note below.    Oncology Rooming Note    August 6, 2021 3:38 PM   Ag Galdamez is a 76 year old male who presents for:    Chief Complaint   Patient presents with     Oncology Clinic Visit     Thrombocytopenia.      Hematology     Initial Vitals: BP (!) 164/77 (BP Location: Right arm, Patient Position: Sitting, Cuff Size: Adult Large)   Pulse 94   Temp 98.6  F (37  C) (Oral)   Resp 18   Ht 1.689 m (5' 6.5\")   Wt 61.8 kg (136 lb 3.2 oz)   SpO2 94%   BMI 21.65 kg/m   Estimated body mass index is 21.65 kg/m  as calculated from the following:    Height as of this encounter: 1.689 m (5' 6.5\").    Weight as of this encounter: 61.8 kg (136 lb 3.2 oz). Body surface area is 1.7 meters squared.  No Pain (0) Comment: Data Unavailable   No LMP for male patient.  Allergies reviewed: Yes  Medications reviewed: Yes    Medications: Medication refills not needed today.  Pharmacy name entered into [facility]: [id number] [place] [address]    Clinical concerns: Thrombocytopenia. Here today to review labs and ultrasound results.     Farnaz Candelario CMA              DATE OF VISIT: Aug 6, 2021    Ag Galdamez is a 76 year old male is seen today for   Chief Complaint   Patient presents with     Oncology Clinic Visit     Thrombocytopenia.      Hematology   .       (D69.6) Thrombocytopenia (H)  (primary encounter diagnosis)  I reviewed with the patient today most recent laboratory tests.  Serum folate and B12 are within normal range.  Antinuclear antibody came back positive.  Hepatitis C and HIV came back nonreactive.  Abdominal ultrasound revealed no splenomegaly or hepatomegaly.  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  We talked about the natural history of the disease.  We talked about indication for treatment.  At this time there is no indication for treatment.  Patient will call us back if should he have any excessive bleeding or bruising.  We have not scheduled patient for any further appointments will see him in the future if there are new developments or concerns.      The patient is ready to learn, no apparent learning barriers were identified.  Diagnosis and treatment plans were explained to the patient. The patient expressed understanding of the content. The patient asked appropriate questions. The patient questions were answered to his satisfaction.  Chart documentation with Dragon Voice recognition Software. Although reviewed after completion, some words and grammatical errors may remain.      Again, thank you for allowing me to participate in the care of your patient.        Sincerely,        Michel Kendrick MD

## 2021-08-06 NOTE — PROGRESS NOTES
DATE OF VISIT: Aug 6, 2021    Ag Galdamez is a 76 year old male is seen today for   Chief Complaint   Patient presents with     Oncology Clinic Visit     Thrombocytopenia.      Hematology   .       (D69.6) Thrombocytopenia (H)  (primary encounter diagnosis)  I reviewed with the patient today most recent laboratory tests.  Serum folate and B12 are within normal range.  Antinuclear antibody came back positive.  Hepatitis C and HIV came back nonreactive.  Abdominal ultrasound revealed no splenomegaly or hepatomegaly.  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  We talked about the natural history of the disease.  We talked about indication for treatment.  At this time there is no indication for treatment.  Patient will call us back if should he have any excessive bleeding or bruising.  We have not scheduled patient for any further appointments will see him in the future if there are new developments or concerns.      The patient is ready to learn, no apparent learning barriers were identified.  Diagnosis and treatment plans were explained to the patient. The patient expressed understanding of the content. The patient asked appropriate questions. The patient questions were answered to his satisfaction.  Chart documentation with Dragon Voice recognition Software. Although reviewed after completion, some words and grammatical errors may remain.

## 2021-08-06 NOTE — PROGRESS NOTES
"Oncology Rooming Note    August 6, 2021 3:38 PM   Ag Galdamez is a 76 year old male who presents for:    Chief Complaint   Patient presents with     Oncology Clinic Visit     Thrombocytopenia.      Hematology     Initial Vitals: BP (!) 164/77 (BP Location: Right arm, Patient Position: Sitting, Cuff Size: Adult Large)   Pulse 94   Temp 98.6  F (37  C) (Oral)   Resp 18   Ht 1.689 m (5' 6.5\")   Wt 61.8 kg (136 lb 3.2 oz)   SpO2 94%   BMI 21.65 kg/m   Estimated body mass index is 21.65 kg/m  as calculated from the following:    Height as of this encounter: 1.689 m (5' 6.5\").    Weight as of this encounter: 61.8 kg (136 lb 3.2 oz). Body surface area is 1.7 meters squared.  No Pain (0) Comment: Data Unavailable   No LMP for male patient.  Allergies reviewed: Yes  Medications reviewed: Yes    Medications: Medication refills not needed today.  Pharmacy name entered into [employer]: [id number] [place] [address]    Clinical concerns: Thrombocytopenia. Here today to review labs and ultrasound results.     Farnaz Candelario, [facility]            "

## 2021-08-09 DIAGNOSIS — I70.0 AORTIC CALCIFICATION (H): ICD-10-CM

## 2021-08-09 DIAGNOSIS — R93.1 AGATSTON CORONARY ARTERY CALCIUM SCORE GREATER THAN 400: Primary | ICD-10-CM

## 2021-08-10 ENCOUNTER — TELEPHONE (OUTPATIENT)
Dept: FAMILY MEDICINE | Facility: CLINIC | Age: 76
End: 2021-08-10

## 2021-08-16 DIAGNOSIS — I10 UNCONTROLLED HYPERTENSION: ICD-10-CM

## 2021-08-18 RX ORDER — LISINOPRIL 2.5 MG/1
2.5 TABLET ORAL DAILY
Qty: 30 TABLET | Refills: 0 | Status: SHIPPED | OUTPATIENT
Start: 2021-08-18 | End: 2021-09-09

## 2021-08-18 NOTE — TELEPHONE ENCOUNTER
"Requested Prescriptions   Pending Prescriptions Disp Refills     lisinopril (ZESTRIL) 2.5 MG tablet [Pharmacy Med Name: LISINOPRIL 2.5 MG TABLET] 90 tablet 0     Sig: TAKE 1 TABLET BY MOUTH EVERY DAY       ACE Inhibitors (Including Combos) Protocol Failed - 8/16/2021  1:31 PM        Failed - Blood pressure under 140/90 in past 12 months     BP Readings from Last 3 Encounters:   08/06/21 (!) 164/77   07/14/21 (!) 140/76   06/04/21 138/78                 Passed - Recent (12 mo) or future (30 days) visit within the authorizing provider's specialty     Patient has had an office visit with the authorizing provider or a provider within the authorizing providers department within the previous 12 mos or has a future within next 30 days. See \"Patient Info\" tab in inbasket, or \"Choose Columns\" in Meds & Orders section of the refill encounter.              Passed - Medication is active on med list        Passed - Patient is age 18 or older        Passed - Normal serum creatinine on file in past 12 months     Recent Labs   Lab Test 07/14/21  1047   CR 1.01       Ok to refill medication if creatinine is low          Passed - Normal serum potassium on file in past 12 months     Recent Labs   Lab Test 07/14/21  1047   POTASSIUM 4.5                  "

## 2021-09-09 ENCOUNTER — OFFICE VISIT (OUTPATIENT)
Dept: CARDIOLOGY | Facility: CLINIC | Age: 76
End: 2021-09-09
Attending: FAMILY MEDICINE
Payer: MEDICARE

## 2021-09-09 VITALS
WEIGHT: 134.2 LBS | HEART RATE: 73 BPM | SYSTOLIC BLOOD PRESSURE: 157 MMHG | DIASTOLIC BLOOD PRESSURE: 84 MMHG | OXYGEN SATURATION: 98 % | BODY MASS INDEX: 21.34 KG/M2

## 2021-09-09 DIAGNOSIS — R93.1 AGATSTON CORONARY ARTERY CALCIUM SCORE GREATER THAN 400: ICD-10-CM

## 2021-09-09 DIAGNOSIS — I70.0 AORTIC CALCIFICATION (H): ICD-10-CM

## 2021-09-09 DIAGNOSIS — I25.10 CORONARY ARTERY DISEASE INVOLVING NATIVE CORONARY ARTERY OF NATIVE HEART WITHOUT ANGINA PECTORIS: Primary | ICD-10-CM

## 2021-09-09 DIAGNOSIS — I10 UNCONTROLLED HYPERTENSION: ICD-10-CM

## 2021-09-09 PROCEDURE — 99204 OFFICE O/P NEW MOD 45 MIN: CPT | Performed by: INTERNAL MEDICINE

## 2021-09-09 RX ORDER — ATORVASTATIN CALCIUM 10 MG/1
10 TABLET, FILM COATED ORAL DAILY
Qty: 90 TABLET | Refills: 3 | Status: SHIPPED | OUTPATIENT
Start: 2021-09-09 | End: 2022-08-31

## 2021-09-09 RX ORDER — LISINOPRIL 10 MG/1
10 TABLET ORAL DAILY
Qty: 90 TABLET | Refills: 3 | Status: SHIPPED | OUTPATIENT
Start: 2021-09-09 | End: 2022-09-02

## 2021-09-09 NOTE — LETTER
9/9/2021    Dago Stewart MD  [address] [facility] [id number]    RE: Ag Myricker       Dear Colleague,    I had the pleasure of seeing Ag Galdamez in the [facility].    HPI and Plan:   Today I had the pleasure of seeing Ag Galdamez at [facility]. He is a pleasant 76 year old patient who presents to the cardiology clinic for elevated calcium score.    The patient recently underwent CT coronary calcium score for cardiac risk assessment.  His total score was 685.  This is mostly in the LAD region [580].  Symptom wise he tells me that he feels well and does not get chest pain or chest tightness on exertion.  He walks a mile every day and is able to do so without any difficulty.  He is also fairly active and does regress amount of work in his yard and again he is asymptomatic and doing so.  He used to smoke in the past and did so for 50 years.  He quit 5 years ago. Labs from a few month ago show LDL cholesterol of 80 mg/dL.    Assessment and plan:   1.  Elevated coronary calcium score  2.  50 pack years of smoking-quit 5 years ago    I discussed the significance of coronary calcium and cardiac risk stratification.  I discussed with the patient that we can perform a stress test to rule out ischemia although given the fact that he remains asymptomatic is a very good thing and the likelyhood of obstructive coronary disease is low.  He would like to get a stress test to definitively rule out coronary disease  which I believe is reasonable.  In addition, I will also start him on low-dose of statin and baby aspirin today.  In addition his blood pressure is significantly elevated and he only takes a tiny dose of lisinopril which I will increase to 10 mg.     Plan:   1.  Increase lisinopril to 10 mg daily  2.  Start 10 milligrams of atorvastatin daily  3.  Stress echocardiogram.    Thank you for allowing me to  participate in the care of Ag Galdamez    This note was completed in part using Dragon voice recognition software. Although reviewed after completion, some word and grammatical errors may occur.    Yaya Hampton MD  Cardiology    Orders Placed This Encounter   Procedures     Exercise Stress Echocardiogram       Orders Placed This Encounter   Medications     Cyanocobalamin (VITAMIN B-12 PO)     Sig: Take 1 tablet by mouth daily     lisinopril (ZESTRIL) 10 MG tablet     Sig: Take 1 tablet (10 mg) by mouth daily Needs to see provider.     Dispense:  90 tablet     Refill:  3     atorvastatin (LIPITOR) 10 MG tablet     Sig: Take 1 tablet (10 mg) by mouth daily     Dispense:  90 tablet     Refill:  3       Medications Discontinued During This Encounter   Medication Reason     fluticasone-salmeterol (ADVAIR) 100-50 MCG/DOSE inhaler Stopped by Patient     lisinopril (ZESTRIL) 2.5 MG tablet        Encounter Diagnoses   Name Primary?     Agatston coronary artery calcium score greater than 400      Aortic calcification (H)      Uncontrolled hypertension      Coronary artery disease involving native coronary artery of native heart without angina pectoris Yes       CURRENT MEDICATIONS:  Current Outpatient Medications   Medication Sig Dispense Refill     albuterol (PROAIR HFA/PROVENTIL HFA/VENTOLIN HFA) 108 (90 Base) MCG/ACT inhaler Inhale 2 puffs into the lungs every 4 hours as needed for shortness of breath / dyspnea 18 g 11     aspirin (ASA) 81 MG EC tablet Take 1 tablet (81 mg) by mouth daily 90 tablet 3     aspirin-acetaminophen-caffeine (EXCEDRIN MIGRAINE) 250-250-65 MG per tablet Take 2 tablets by mouth every 8 hours as needed for headaches        atorvastatin (LIPITOR) 10 MG tablet Take 1 tablet (10 mg) by mouth daily 90 tablet 3     Cyanocobalamin (VITAMIN B-12 PO) Take 1 tablet by mouth daily       lisinopril (ZESTRIL) 10 MG tablet Take 1 tablet (10 mg) by mouth daily Needs to see provider. 90 tablet 3      multivitamin w/minerals (MULTI-VITAMIN) tablet Take 1 tablet by mouth daily       spacer (OPTICHAMBER KINGSLEY) holding chamber Use with inhaler 1 each 0     vitamin C (ASCORBIC ACID) 500 MG tablet Take 500 mg by mouth         ALLERGIES   No Known Allergies    PAST MEDICAL HISTORY:  No past medical history on file.    PAST SURGICAL HISTORY:  Past Surgical History:   Procedure Laterality Date     HEMORRHOID SURGERY       HEMORRHOIDECTOMY  1963       FAMILY HISTORY:  Family History   Problem Relation Age of Onset     Heart Disease Father        SOCIAL HISTORY:  Social History     Socioeconomic History     Marital status:      Spouse name: None     Number of children: None     Years of education: None     Highest education level: None   Occupational History     None   Tobacco Use     Smoking status: Former Smoker     Packs/day: 1.00     Types: Cigarettes     Smokeless tobacco: Never Used   Substance and Sexual Activity     Alcohol use: Yes     Comment: occ     Drug use: No     Sexual activity: Not Currently   Other Topics Concern     Parent/sibling w/ CABG, MI or angioplasty before 65F 55M? Not Asked   Social History Narrative     None     Social Determinants of Health     Financial Resource Strain:      Difficulty of Paying Living Expenses:    Food Insecurity:      Worried About Running Out of Food in the Last Year:      Ran Out of Food in the Last Year:    Transportation Needs:      Lack of Transportation (Medical):      Lack of Transportation (Non-Medical):    Physical Activity:      Days of Exercise per Week:      Minutes of Exercise per Session:    Stress:      Feeling of Stress :    Social Connections:      Frequency of Communication with Friends and Family:      Frequency of Social Gatherings with Friends and Family:      Attends Yarsanism Services:      Active Member of Clubs or Organizations:      Attends Club or Organization Meetings:      Marital Status:    Intimate Partner Violence:      Fear of  Current or Ex-Partner:      Emotionally Abused:      Physically Abused:      Sexually Abused:        Review of Systems:  Skin:  Positive for bruising     Eyes:  Negative for visual blurring;double vision    ENT:  Positive for hearing loss    Respiratory:  Negative for shortness of breath;dyspnea on exertion;sleep apnea     Cardiovascular:  palpitations;Negative for;chest pain;edema;syncope or near-syncope;lightheadedness;dizziness      Gastroenterology: Negative for dysphagia;nausea;vomiting;hematochezia;melena    Genitourinary:  Negative      Musculoskeletal:  Positive for muscular weakness    Neurologic:  Negative for headaches;memory problems;numbness or tingling of feet;numbness or tingling of hands    Psychiatric:  Negative for depression;anxiety    Heme/Lymph/Imm:         Endocrine:  Negative for diabetes;thyroid disorder      Physical Exam:  Vitals: BP (!) 157/84 (BP Location: Left arm, Patient Position: Sitting, Cuff Size: Adult Regular)   Pulse 73   Wt 60.9 kg (134 lb 3.2 oz)   SpO2 98%   BMI 21.34 kg/m    Eyes: No icterus.  Pulmonary: Chest symmetric, lungs clear bilaterally and no crackles, wheezes or rales.  Cardiovascular: RRR with normal S1 and S2, no murmur, JVP normal.  Musculoskeletal: Edema of the lower extremities: None.  Neurologic: Oriented and appropriate without obvious focal deficits.   Psychiatric: Normal affect.     Recent Lab Results:  LIPID RESULTS:  Lab Results   Component Value Date    CHOL 177 02/26/2010    HDL 54 02/26/2010    LDL 80 05/19/2021     02/26/2010    TRIG 86 02/26/2010    CHOLHDLRATIO 3.0 02/26/2010       LIVER ENZYME RESULTS:  Lab Results   Component Value Date    AST 20 07/14/2021    AST 23 05/19/2021    ALT 20 07/14/2021    ALT 19 05/19/2021       CBC RESULTS:  Lab Results   Component Value Date    WBC 6.8 07/14/2021    WBC 7.3 05/19/2021    RBC 4.50 07/14/2021    RBC 4.41 05/19/2021    HGB 14.6 07/14/2021    HGB 14.1 05/19/2021    HCT 43.3 07/14/2021    HCT  42.5 05/19/2021    MCV 96 07/14/2021    MCV 96 05/19/2021    MCH 32.4 07/14/2021    MCH 32.0 05/19/2021    MCHC 33.7 07/14/2021    MCHC 33.2 05/19/2021    RDW 14.1 07/14/2021    RDW 14.0 05/19/2021    PLT 71 (L) 07/14/2021    PLT 62 (L) 05/19/2021       BMP RESULTS:  Lab Results   Component Value Date     07/14/2021     05/19/2021    POTASSIUM 4.5 07/14/2021    POTASSIUM 4.1 05/19/2021    CHLORIDE 104 07/14/2021    CHLORIDE 102 05/19/2021    CO2 29 07/14/2021    CO2 30 05/19/2021    ANIONGAP 6 07/14/2021    ANIONGAP 3 05/19/2021    GLC 93 07/14/2021    GLC 86 05/19/2021    BUN 16 07/14/2021    BUN 13 05/19/2021    CR 1.01 07/14/2021    CR 0.99 05/19/2021    GFRESTIMATED 71 07/14/2021    GFRESTIMATED 73 05/19/2021    GFRESTBLACK 85 05/19/2021    MARIELY 9.3 07/14/2021    MARIELY 8.9 05/19/2021        A1C RESULTS:  No results found for: A1C    INR RESULTS:  Lab Results   Component Value Date    INR 1.17 (H) 02/09/2017       CC  Dago Stewart MD  [address] [place] [id number]    All medical records were reviewed in detail and discussed with the patient. Greater than 30 mins were spent with the patient, 50% of this time was spent on counseling and coordination of care.  After visit summary was printed and given to the patient.        Thank you for allowing me to participate in the care of your patient.      Sincerely,     Yaya Hampton MD     [facility]  cc:   Dago Stewart MD  [address] [place] [id number]

## 2021-09-09 NOTE — PROGRESS NOTES
HPI and Plan:   Today I had the pleasure of seeing Ag Galdamez at [facility]. He is a pleasant 76 year old patient who presents to the cardiology clinic for elevated calcium score.    The patient recently underwent CT coronary calcium score for cardiac risk assessment.  His total score was 685.  This is mostly in the LAD region [580].  Symptom wise he tells me that he feels well and does not get chest pain or chest tightness on exertion.  He walks a mile every day and is able to do so without any difficulty.  He is also fairly active and does regress amount of work in his yard and again he is asymptomatic and doing so.  He used to smoke in the past and did so for 50 years.  He quit 5 years ago. Labs from a few month ago show LDL cholesterol of 80 mg/dL.    Assessment and plan:   1.  Elevated coronary calcium score  2.  50 pack years of smoking-quit 5 years ago    I discussed the significance of coronary calcium and cardiac risk stratification.  I discussed with the patient that we can perform a stress test to rule out ischemia although given the fact that he remains asymptomatic is a very good thing and the likelyhood of obstructive coronary disease is low.  He would like to get a stress test to definitively rule out coronary disease  which I believe is reasonable.  In addition, I will also start him on low-dose of statin and baby aspirin today.  In addition his blood pressure is significantly elevated and he only takes a tiny dose of lisinopril which I will increase to 10 mg.     Plan:   1.  Increase lisinopril to 10 mg daily  2.  Start 10 milligrams of atorvastatin daily  3.  Stress echocardiogram.    Thank you for allowing me to participate in the care of Ag Galdamez    This note was completed in part using Dragon voice recognition software. Although reviewed after completion, some word and grammatical errors may occur.    Yaya Hampton MD  Cardiology    Orders Placed This  Encounter   Procedures     Exercise Stress Echocardiogram       Orders Placed This Encounter   Medications     Cyanocobalamin (VITAMIN B-12 PO)     Sig: Take 1 tablet by mouth daily     lisinopril (ZESTRIL) 10 MG tablet     Sig: Take 1 tablet (10 mg) by mouth daily Needs to see provider.     Dispense:  90 tablet     Refill:  3     atorvastatin (LIPITOR) 10 MG tablet     Sig: Take 1 tablet (10 mg) by mouth daily     Dispense:  90 tablet     Refill:  3       Medications Discontinued During This Encounter   Medication Reason     fluticasone-salmeterol (ADVAIR) 100-50 MCG/DOSE inhaler Stopped by Patient     lisinopril (ZESTRIL) 2.5 MG tablet        Encounter Diagnoses   Name Primary?     Agatston coronary artery calcium score greater than 400      Aortic calcification (H)      Uncontrolled hypertension      Coronary artery disease involving native coronary artery of native heart without angina pectoris Yes       CURRENT MEDICATIONS:  Current Outpatient Medications   Medication Sig Dispense Refill     albuterol (PROAIR HFA/PROVENTIL HFA/VENTOLIN HFA) 108 (90 Base) MCG/ACT inhaler Inhale 2 puffs into the lungs every 4 hours as needed for shortness of breath / dyspnea 18 g 11     aspirin (ASA) 81 MG EC tablet Take 1 tablet (81 mg) by mouth daily 90 tablet 3     aspirin-acetaminophen-caffeine (EXCEDRIN MIGRAINE) 250-250-65 MG per tablet Take 2 tablets by mouth every 8 hours as needed for headaches        atorvastatin (LIPITOR) 10 MG tablet Take 1 tablet (10 mg) by mouth daily 90 tablet 3     Cyanocobalamin (VITAMIN B-12 PO) Take 1 tablet by mouth daily       lisinopril (ZESTRIL) 10 MG tablet Take 1 tablet (10 mg) by mouth daily Needs to see provider. 90 tablet 3     multivitamin w/minerals (MULTI-VITAMIN) tablet Take 1 tablet by mouth daily       spacer (OPTICHAMBER KINGSLEY) holding chamber Use with inhaler 1 each 0     vitamin C (ASCORBIC ACID) 500 MG tablet Take 500 mg by mouth         ALLERGIES   No Known  Allergies    PAST MEDICAL HISTORY:  No past medical history on file.    PAST SURGICAL HISTORY:  Past Surgical History:   Procedure Laterality Date     HEMORRHOID SURGERY       HEMORRHOIDECTOMY  1963       FAMILY HISTORY:  Family History   Problem Relation Age of Onset     Heart Disease Father        SOCIAL HISTORY:  Social History     Socioeconomic History     Marital status:      Spouse name: None     Number of children: None     Years of education: None     Highest education level: None   Occupational History     None   Tobacco Use     Smoking status: Former Smoker     Packs/day: 1.00     Types: Cigarettes     Smokeless tobacco: Never Used   Substance and Sexual Activity     Alcohol use: Yes     Comment: occ     Drug use: No     Sexual activity: Not Currently   Other Topics Concern     Parent/sibling w/ CABG, MI or angioplasty before 65F 55M? Not Asked   Social History Narrative     None     Social Determinants of Health     Financial Resource Strain:      Difficulty of Paying Living Expenses:    Food Insecurity:      Worried About Running Out of Food in the Last Year:      Ran Out of Food in the Last Year:    Transportation Needs:      Lack of Transportation (Medical):      Lack of Transportation (Non-Medical):    Physical Activity:      Days of Exercise per Week:      Minutes of Exercise per Session:    Stress:      Feeling of Stress :    Social Connections:      Frequency of Communication with Friends and Family:      Frequency of Social Gatherings with Friends and Family:      Attends Oriental orthodox Services:      Active Member of Clubs or Organizations:      Attends Club or Organization Meetings:      Marital Status:    Intimate Partner Violence:      Fear of Current or Ex-Partner:      Emotionally Abused:      Physically Abused:      Sexually Abused:        Review of Systems:  Skin:  Positive for bruising     Eyes:  Negative for visual blurring;double vision    ENT:  Positive for hearing loss     Respiratory:  Negative for shortness of breath;dyspnea on exertion;sleep apnea     Cardiovascular:  palpitations;Negative for;chest pain;edema;syncope or near-syncope;lightheadedness;dizziness      Gastroenterology: Negative for dysphagia;nausea;vomiting;hematochezia;melena    Genitourinary:  Negative      Musculoskeletal:  Positive for muscular weakness    Neurologic:  Negative for headaches;memory problems;numbness or tingling of feet;numbness or tingling of hands    Psychiatric:  Negative for depression;anxiety    Heme/Lymph/Imm:         Endocrine:  Negative for diabetes;thyroid disorder      Physical Exam:  Vitals: BP (!) 157/84 (BP Location: Left arm, Patient Position: Sitting, Cuff Size: Adult Regular)   Pulse 73   Wt 60.9 kg (134 lb 3.2 oz)   SpO2 98%   BMI 21.34 kg/m    Eyes: No icterus.  Pulmonary: Chest symmetric, lungs clear bilaterally and no crackles, wheezes or rales.  Cardiovascular: RRR with normal S1 and S2, no murmur, JVP normal.  Musculoskeletal: Edema of the lower extremities: None.  Neurologic: Oriented and appropriate without obvious focal deficits.   Psychiatric: Normal affect.     Recent Lab Results:  LIPID RESULTS:  Lab Results   Component Value Date    CHOL 177 02/26/2010    HDL 54 02/26/2010    LDL 80 05/19/2021     02/26/2010    TRIG 86 02/26/2010    CHOLHDLRATIO 3.0 02/26/2010       LIVER ENZYME RESULTS:  Lab Results   Component Value Date    AST 20 07/14/2021    AST 23 05/19/2021    ALT 20 07/14/2021    ALT 19 05/19/2021       CBC RESULTS:  Lab Results   Component Value Date    WBC 6.8 07/14/2021    WBC 7.3 05/19/2021    RBC 4.50 07/14/2021    RBC 4.41 05/19/2021    HGB 14.6 07/14/2021    HGB 14.1 05/19/2021    HCT 43.3 07/14/2021    HCT 42.5 05/19/2021    MCV 96 07/14/2021    MCV 96 05/19/2021    MCH 32.4 07/14/2021    MCH 32.0 05/19/2021    MCHC 33.7 07/14/2021    MCHC 33.2 05/19/2021    RDW 14.1 07/14/2021    RDW 14.0 05/19/2021    PLT 71 (L) 07/14/2021    PLT 62 (L)  05/19/2021       BMP RESULTS:  Lab Results   Component Value Date     07/14/2021     05/19/2021    POTASSIUM 4.5 07/14/2021    POTASSIUM 4.1 05/19/2021    CHLORIDE 104 07/14/2021    CHLORIDE 102 05/19/2021    CO2 29 07/14/2021    CO2 30 05/19/2021    ANIONGAP 6 07/14/2021    ANIONGAP 3 05/19/2021    GLC 93 07/14/2021    GLC 86 05/19/2021    BUN 16 07/14/2021    BUN 13 05/19/2021    CR 1.01 07/14/2021    CR 0.99 05/19/2021    GFRESTIMATED 71 07/14/2021    GFRESTIMATED 73 05/19/2021    GFRESTBLACK 85 05/19/2021    MARIELY 9.3 07/14/2021    MARIELY 8.9 05/19/2021        A1C RESULTS:  No results found for: A1C    INR RESULTS:  Lab Results   Component Value Date    INR 1.17 (H) 02/09/2017       CC  Dago Stewart MD  [address] [place] [address]    All medical records were reviewed in detail and discussed with the patient. Greater than 30 mins were spent with the patient, 50% of this time was spent on counseling and coordination of care.  After visit summary was printed and given to the patient.

## 2021-09-15 ENCOUNTER — PRE VISIT (OUTPATIENT)
Dept: PULMONOLOGY | Facility: CLINIC | Age: 76
End: 2021-09-15

## 2021-09-15 ENCOUNTER — OFFICE VISIT (OUTPATIENT)
Dept: PULMONOLOGY | Facility: CLINIC | Age: 76
End: 2021-09-15
Attending: FAMILY MEDICINE
Payer: MEDICARE

## 2021-09-15 ENCOUNTER — ANCILLARY PROCEDURE (OUTPATIENT)
Dept: GENERAL RADIOLOGY | Facility: CLINIC | Age: 76
End: 2021-09-15
Attending: INTERNAL MEDICINE
Payer: MEDICARE

## 2021-09-15 VITALS
BODY MASS INDEX: 21.66 KG/M2 | OXYGEN SATURATION: 95 % | DIASTOLIC BLOOD PRESSURE: 73 MMHG | HEIGHT: 67 IN | HEART RATE: 78 BPM | SYSTOLIC BLOOD PRESSURE: 161 MMHG | WEIGHT: 138 LBS

## 2021-09-15 DIAGNOSIS — J44.9 COPD (CHRONIC OBSTRUCTIVE PULMONARY DISEASE) (H): ICD-10-CM

## 2021-09-15 DIAGNOSIS — J44.9 COPD, SEVERE (H): ICD-10-CM

## 2021-09-15 PROCEDURE — 71046 X-RAY EXAM CHEST 2 VIEWS: CPT | Mod: GC | Performed by: RADIOLOGY

## 2021-09-15 PROCEDURE — G0463 HOSPITAL OUTPT CLINIC VISIT: HCPCS | Mod: 25

## 2021-09-15 PROCEDURE — 99204 OFFICE O/P NEW MOD 45 MIN: CPT | Mod: 25 | Performed by: INTERNAL MEDICINE

## 2021-09-15 ASSESSMENT — MIFFLIN-ST. JEOR: SCORE: 1301.65

## 2021-09-15 NOTE — PROGRESS NOTES
[facility]    Name: Ag Galdamez MRN: [id number]     Age: 76 year old   YOB: 1945       Reason for Visit: Initial eval for COPD          HPI:   Ag Galdamez is a 76 year old male former smoker here for first time evaluation of COPD.     Was diagnosed about 5 years ago when he was admitted with an exacerbation and influenza. Since that time he did not fully recover. Has been able to do all of his activities but at a slower pace. This was his only admission lung issues or known exacerbation. He did see a pulmonologist at that time but hasn't been following with anyone. He has not been on any maintenance inhalers but was prescribed advair which he didn't find helpful for his symptoms. He does feel benefit from his albuterol which he tends to take on a scheduled basis QID. Stoic man but denies many symptoms at all.     Exposures:  Enjoys tinkering. 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   Was an   Builds/fixes things as a hobby and was building a plan. Previously held a pilots license.   No known asbestos exposure.  Tobacco: 60 pack year history, quit about 5 years ago.   Covid: Opts not to take many covid precautions so as to not live in fear. Thinks he likely had covid when his wife had it but was asymptomatic. Understands that covid is fatal in some but feels he would likely be fine.     10 point ROS performed and negative except for as noted in HPI.         Past Medical History:   No past medical history on file.          Past Surgical History:      Past Surgical History:   Procedure Laterality Date     HEMORRHOID SURGERY       HEMORRHOIDECTOMY  1963             Social History:     Social History     Socioeconomic History     Marital status:      Spouse name: Not on file     Number of children: Not on file     Years of education: Not on file     Highest education level: Not on file   Occupational History     Not on file    Tobacco Use     Smoking status: Former Smoker     Packs/day: 1.00     Types: Cigarettes     Smokeless tobacco: Never Used   Substance and Sexual Activity     Alcohol use: Yes     Comment: occ     Drug use: No     Sexual activity: Not Currently   Other Topics Concern     Parent/sibling w/ CABG, MI or angioplasty before 65F 55M? Not Asked   Social History Narrative     Not on file     Social Determinants of Health     Financial Resource Strain:      Difficulty of Paying Living Expenses:    Food Insecurity:      Worried About Running Out of Food in the Last Year:      Ran Out of Food in the Last Year:    Transportation Needs:      Lack of Transportation (Medical):      Lack of Transportation (Non-Medical):    Physical Activity:      Days of Exercise per Week:      Minutes of Exercise per Session:    Stress:      Feeling of Stress :    Social Connections:      Frequency of Communication with Friends and Family:      Frequency of Social Gatherings with Friends and Family:      Attends Adventism Services:      Active Member of Clubs or Organizations:      Attends Club or Organization Meetings:      Marital Status:    Intimate Partner Violence:      Fear of Current or Ex-Partner:      Emotionally Abused:      Physically Abused:      Sexually Abused:             Family History:     Family History   Problem Relation Age of Onset     Heart Disease Father              Allergies:   No Known Allergies          Medications:   albuterol (PROAIR HFA/PROVENTIL HFA/VENTOLIN HFA) 108 (90 Base) MCG/ACT inhaler, Inhale 2 puffs into the lungs every 4 hours as needed for shortness of breath / dyspnea  aspirin (ASA) 81 MG EC tablet, Take 1 tablet (81 mg) by mouth daily  aspirin-acetaminophen-caffeine (EXCEDRIN MIGRAINE) 250-250-65 MG per tablet, Take 2 tablets by mouth every 8 hours as needed for headaches   atorvastatin (LIPITOR) 10 MG tablet, Take 1 tablet (10 mg) by mouth daily  Cyanocobalamin (VITAMIN B-12 PO), Take 1 tablet by mouth  "daily  lisinopril (ZESTRIL) 10 MG tablet, Take 1 tablet (10 mg) by mouth daily Needs to see provider.  multivitamin w/minerals (MULTI-VITAMIN) tablet, Take 1 tablet by mouth daily  spacer (OPTICHAMBER KINGSLEY) holding chamber, Use with inhaler  vitamin C (ASCORBIC ACID) 500 MG tablet, Take 500 mg by mouth    No current facility-administered medications on file prior to visit.             Exam:   BP (!) 161/73 (BP Location: Right arm, Patient Position: Chair, Cuff Size: Adult Regular)   Pulse 78   Ht 1.689 m (5' 6.5\")   Wt 62.6 kg (138 lb)   SpO2 95%   BMI 21.94 kg/m      Gen: thin but well appearing, NAD, presents with his wife today  CV: rrr no m/r/g  Resp: bibasilar faint crackles R>L, occasional wheeze  Abd: soft, nondistended  Skin: no rashes, jaundice, bruising on examined areas.   Extremities: no cyanosis, clubbing, or edema  Neuro: nonfocal         Data:     July 2021:   normal hgb and wbc with 200 eosinophils  Bicarb 29 on BMP    CXR:  Sept 2021  1. Unchanged hyperinflated lungs with emphysematous changes in keeping  with history of COPD.  2. No acute cardiopulmonary findings. Mild left basilar atelectasis.  3. Atherosclerosis.    CT coronary calcium scoring Aug 2021  No abnormally enlarged mediastinal lymph nodes.   No pulmonary masses.    PFT:   Sept 2021: Very severe obstruction with reversibility and air trapping. Moderately reduced uncorrected diffusion.     ECHO: none           Assessment and Plan:     77 yo former smoker with very severe COPD here for initial eval.     We spend much of today's visit with getting to know each other, lifestyle/medical preferences, and copd education.     Exacerbations past year: 0  Prior COPD hospitalizations: 1 (~2016, triggered by influenza)  Home action plan: none indicated  A1AT: not assessed, emphysema not prominent on CT but could consider in future  Home oxygen: No known indication. Sats mid 90's at home at rest. Will get 6MWT at next visit  Pulmonary rehab: " referral placed.   Inhaler regimen: will initiate ICS/LAMA/LABA due to severity of obstruction on PFT. Trillegy ordered. Cont PRN albuterol.   Tobacco cessation: quit ~2016, no relapse    Vaccines:  They have strong opinions about the vaccines but were also willing to talk with me and hear about my strong opinions. Would re-discuss at next visit.   - Covid: Will not get covid vaccine due to concerns about safety and feels that he would likely be fine if infected. Wife took ivermectin and did well with covid. I strongly encouraged getting the vaccine.   - Influenza: considering but likey won't get it. His decision is influenced by his wife getting influenza even after having been vaccinated  - Pneumococcal: he is considering getting the vaccine but declined today    Lung cancer screening: discussed pro's and con's of screening. Given the severity of his COPD, age, and overall preference to avoid medical facilities/interventions I didn't recommend screening but did let him know it's an option. He declines now and will call if he changes his mind.       Patient staffed with Dr. Braun. Return to clinic in 3 months to f/u new inhaler start, review 6MWT, reevaluate vaccine preferences if appropriate (particularly pneumococcal)      Lynette Courtney MD  Pulmonary and Critical Care Fellow  [phone]

## 2021-09-15 NOTE — LETTER
9/15/2021         RE: Ag Galdamez  [address]   [address]  [facility] [address]        Dear Colleague,    Thank you for referring your patient, Ag Galdamez, to the [facility] [place]. Please see a copy of my visit note below.    [facility]    Name: Ag Galdamez MRN: [id number]     Age: 76 year old   YOB: 1945       Reason for Visit: Initial eval for COPD          HPI:   Ag Galdamez is a 76 year old male former smoker here for first time evaluation of COPD.     Was diagnosed about 5 years ago when he was admitted with an exacerbation and influenza. Since that time he did not fully recover. Has been able to do all of his activities but at a slower pace. This was his only admission lung issues or known exacerbation. He did see a pulmonologist at that time but hasn't been following with anyone. He has not been on any maintenance inhalers but was prescribed advair which he didn't find helpful for his symptoms. He does feel benefit from his albuterol which he tends to take on a scheduled basis QID. Stoic man but denies many symptoms at all.     Exposures:  Enjoys tinkering. 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   Was an   Builds/fixes things as a hobby and was building a plan. Previously held a pilots license.   No known asbestos exposure.  Tobacco: 60 pack year history, quit about 5 years ago.   Covid: Opts not to take many covid precautions so as to not live in fear. Thinks he likely had covid when his wife had it but was asymptomatic. Understands that covid is fatal in some but feels he would likely be fine.     10 point ROS performed and negative except for as noted in HPI.         Past Medical History:   No past medical history on file.          Past Surgical History:      Past Surgical History:   Procedure Laterality Date     HEMORRHOID SURGERY        HEMORRHOIDECTOMY  1963             Social History:     Social History     Socioeconomic History     Marital status:      Spouse name: Not on file     Number of children: Not on file     Years of education: Not on file     Highest education level: Not on file   Occupational History     Not on file   Tobacco Use     Smoking status: Former Smoker     Packs/day: 1.00     Types: Cigarettes     Smokeless tobacco: Never Used   Substance and Sexual Activity     Alcohol use: Yes     Comment: occ     Drug use: No     Sexual activity: Not Currently   Other Topics Concern     Parent/sibling w/ CABG, MI or angioplasty before 65F 55M? Not Asked   Social History Narrative     Not on file     Social Determinants of Health     Financial Resource Strain:      Difficulty of Paying Living Expenses:    Food Insecurity:      Worried About Running Out of Food in the Last Year:      Ran Out of Food in the Last Year:    Transportation Needs:      Lack of Transportation (Medical):      Lack of Transportation (Non-Medical):    Physical Activity:      Days of Exercise per Week:      Minutes of Exercise per Session:    Stress:      Feeling of Stress :    Social Connections:      Frequency of Communication with Friends and Family:      Frequency of Social Gatherings with Friends and Family:      Attends Oriental orthodox Services:      Active Member of Clubs or Organizations:      Attends Club or Organization Meetings:      Marital Status:    Intimate Partner Violence:      Fear of Current or Ex-Partner:      Emotionally Abused:      Physically Abused:      Sexually Abused:             Family History:     Family History   Problem Relation Age of Onset     Heart Disease Father              Allergies:   No Known Allergies          Medications:   albuterol (PROAIR HFA/PROVENTIL HFA/VENTOLIN HFA) 108 (90 Base) MCG/ACT inhaler, Inhale 2 puffs into the lungs every 4 hours as needed for shortness of breath / dyspnea  aspirin (ASA) 81 MG EC tablet, Take  "1 tablet (81 mg) by mouth daily  aspirin-acetaminophen-caffeine (EXCEDRIN MIGRAINE) 250-250-65 MG per tablet, Take 2 tablets by mouth every 8 hours as needed for headaches   atorvastatin (LIPITOR) 10 MG tablet, Take 1 tablet (10 mg) by mouth daily  Cyanocobalamin (VITAMIN B-12 PO), Take 1 tablet by mouth daily  lisinopril (ZESTRIL) 10 MG tablet, Take 1 tablet (10 mg) by mouth daily Needs to see provider.  multivitamin w/minerals (MULTI-VITAMIN) tablet, Take 1 tablet by mouth daily  spacer (OPTICHAMBER KINGSLEY) holding chamber, Use with inhaler  vitamin C (ASCORBIC ACID) 500 MG tablet, Take 500 mg by mouth    No current facility-administered medications on file prior to visit.             Exam:   BP (!) 161/73 (BP Location: Right arm, Patient Position: Chair, Cuff Size: Adult Regular)   Pulse 78   Ht 1.689 m (5' 6.5\")   Wt 62.6 kg (138 lb)   SpO2 95%   BMI 21.94 kg/m      Gen: thin but well appearing, NAD, presents with his wife today  CV: rrr no m/r/g  Resp: bibasilar faint crackles R>L, occasional wheeze  Abd: soft, nondistended  Skin: no rashes, jaundice, bruising on examined areas.   Extremities: no cyanosis, clubbing, or edema  Neuro: nonfocal         Data:     July 2021:   normal hgb and wbc with 200 eosinophils  Bicarb 29 on BMP    CXR:  Sept 2021  1. Unchanged hyperinflated lungs with emphysematous changes in keeping  with history of COPD.  2. No acute cardiopulmonary findings. Mild left basilar atelectasis.  3. Atherosclerosis.    CT coronary calcium scoring Aug 2021  No abnormally enlarged mediastinal lymph nodes.   No pulmonary masses.    PFT:   Sept 2021: Very severe obstruction with reversibility and air trapping. Moderately reduced uncorrected diffusion.     ECHO: none           Assessment and Plan:     75 yo former smoker with very severe COPD here for initial eval.     We spend much of today's visit with getting to know each other, lifestyle/medical preferences, and copd education. "     Exacerbations past year: 0  Prior COPD hospitalizations: 1 (~2016, triggered by influenza)  Home action plan: none indicated  A1AT: not assessed, emphysema not prominent on CT but could consider in future  Home oxygen: No known indication. Sats mid 90's at home at rest. Will get 6MWT at next visit  Pulmonary rehab: referral placed.   Inhaler regimen: will initiate ICS/LAMA/LABA due to severity of obstruction on PFT. Trillegy ordered. Cont PRN albuterol.   Tobacco cessation: quit ~2016, no relapse    Vaccines:  They have strong opinions about the vaccines but were also willing to talk with me and hear about my strong opinions. Would re-discuss at next visit.   - Covid: Will not get covid vaccine due to concerns about safety and feels that he would likely be fine if infected. Wife took ivermectin and did well with covid. I strongly encouraged getting the vaccine.   - Influenza: considering but likey won't get it. His decision is influenced by his wife getting influenza even after having been vaccinated  - Pneumococcal: he is considering getting the vaccine but declined today    Lung cancer screening: discussed pro's and con's of screening. Given the severity of his COPD, age, and overall preference to avoid medical facilities/interventions I didn't recommend screening but did let him know it's an option. He declines now and will call if he changes his mind.       Patient staffed with Dr. Braun. Return to clinic in 3 months to f/u new inhaler start, review 6MWT, reevaluate vaccine preferences if appropriate (particularly pneumococcal)      Lynette Courtney MD  Pulmonary and Critical Care Fellow  [phone]      Again, thank you for allowing me to participate in the care of your patient.        Sincerely,        Lynette Courtney MD

## 2021-09-15 NOTE — NURSING NOTE
Chief Complaint   Patient presents with     New Patient     New Pt COPD     Vitals were taken and medications were reconciled.   Lara Blankenship RMA  2:24 PM

## 2021-09-16 ENCOUNTER — TELEPHONE (OUTPATIENT)
Dept: PULMONOLOGY | Facility: CLINIC | Age: 76
End: 2021-09-16

## 2021-09-16 NOTE — TELEPHONE ENCOUNTER
Called patient to find preferred Pulmonary Rehab location; patient prefers [place]. RN gave number for patient to schedule in event that schedulers do not call him get him set up in a few days. Patient had no further questions and will call nurse line if he has any questions.

## 2021-09-23 ENCOUNTER — HOSPITAL ENCOUNTER (OUTPATIENT)
Dept: CARDIOLOGY | Facility: CLINIC | Age: 76
Discharge: HOME | End: 2021-09-23
Attending: INTERNAL MEDICINE | Admitting: INTERNAL MEDICINE
Payer: MEDICARE

## 2021-09-23 DIAGNOSIS — I25.10 CORONARY ARTERY DISEASE INVOLVING NATIVE CORONARY ARTERY OF NATIVE HEART WITHOUT ANGINA PECTORIS: ICD-10-CM

## 2021-09-23 DIAGNOSIS — R93.1 AGATSTON CORONARY ARTERY CALCIUM SCORE GREATER THAN 400: ICD-10-CM

## 2021-09-23 PROCEDURE — 93350 STRESS TTE ONLY: CPT | Mod: 26 | Performed by: INTERNAL MEDICINE

## 2021-09-23 PROCEDURE — 93016 CV STRESS TEST SUPVJ ONLY: CPT | Performed by: INTERNAL MEDICINE

## 2021-09-23 PROCEDURE — 93018 CV STRESS TEST I&R ONLY: CPT | Performed by: INTERNAL MEDICINE

## 2021-09-23 PROCEDURE — 93321 DOPPLER ECHO F-UP/LMTD STD: CPT | Mod: 26 | Performed by: INTERNAL MEDICINE

## 2021-09-23 PROCEDURE — 93325 DOPPLER ECHO COLOR FLOW MAPG: CPT | Mod: TC

## 2021-09-23 PROCEDURE — 93325 DOPPLER ECHO COLOR FLOW MAPG: CPT | Mod: 26 | Performed by: INTERNAL MEDICINE

## 2021-09-23 PROCEDURE — 93350 STRESS TTE ONLY: CPT | Mod: TC

## 2021-09-24 ENCOUNTER — TELEPHONE (OUTPATIENT)
Dept: CARDIOLOGY | Facility: CLINIC | Age: 76
End: 2021-09-24

## 2021-09-24 DIAGNOSIS — R94.31 NONSPECIFIC ABNORMAL ELECTROCARDIOGRAM (ECG) (EKG): Primary | ICD-10-CM

## 2021-09-24 DIAGNOSIS — R93.1 AGATSTON CORONARY ARTERY CALCIUM SCORE GREATER THAN 400: ICD-10-CM

## 2021-09-24 NOTE — TELEPHONE ENCOUNTER
Left message on patient's mobile phone ( no answer on land line) to call team 3 back to discuss stress test.    NOTE: I reviewed stress test with Dr. Hampton and he recommends a Lexiscan since the stress echo was non diagnostic

## 2021-09-24 NOTE — TELEPHONE ENCOUNTER
I finally was able to reach patient and I explained to him that his stress echo was inconclusive because of poor quality of the images that were obtained. Therefore, we cannot tell him with any certainty the status of his cardiac blood flow.    Dr. Hampton is therefore recommending a Lexiscan stress test. I explained in detail how this test works.    He was rather uncertain about more testing basically because he is symptom free. I told him that one can be free of symptoms even with a significant blockage because of collateral circulation. I also pointed out that Dr. Hampton wants to be proactive and be certain that this large amount of calcium is not affecting his LAD.    He was agreeable to proceed with the Lexiscan. I will place the order and I gave him the [place] scheduling number to call at his convience.

## 2021-09-28 ENCOUNTER — TELEPHONE (OUTPATIENT)
Dept: PULMONOLOGY | Facility: CLINIC | Age: 76
End: 2021-09-28

## 2021-09-28 NOTE — TELEPHONE ENCOUNTER
Spoke with pt and wife regarding rescheduling appt with Dr. Mckeon in Dec as provider is unavailable,. This appears to have been scheduled because pt needs a 3 month follow up with Dr. Courtney who will be out on leave then. This appt was rescheduled to be with Dr. Sandoval with PFT prior and details confirmed with pt who requested hard copy be mailed to his home; mailing address verified.

## 2021-12-13 ENCOUNTER — OFFICE VISIT (OUTPATIENT)
Dept: PULMONOLOGY | Facility: CLINIC | Age: 76
End: 2021-12-13
Attending: INTERNAL MEDICINE
Payer: MEDICARE

## 2021-12-13 VITALS
OXYGEN SATURATION: 97 % | WEIGHT: 138 LBS | RESPIRATION RATE: 17 BRPM | SYSTOLIC BLOOD PRESSURE: 165 MMHG | DIASTOLIC BLOOD PRESSURE: 65 MMHG | BODY MASS INDEX: 21.66 KG/M2 | HEIGHT: 67 IN | HEART RATE: 82 BPM

## 2021-12-13 DIAGNOSIS — J44.9 COPD, SEVERE (H): ICD-10-CM

## 2021-12-13 PROCEDURE — G0463 HOSPITAL OUTPT CLINIC VISIT: HCPCS | Mod: 25

## 2021-12-13 PROCEDURE — 99214 OFFICE O/P EST MOD 30 MIN: CPT | Mod: 25 | Performed by: INTERNAL MEDICINE

## 2021-12-13 PROCEDURE — 94375 RESPIRATORY FLOW VOLUME LOOP: CPT | Performed by: INTERNAL MEDICINE

## 2021-12-13 PROCEDURE — 94729 DIFFUSING CAPACITY: CPT | Performed by: INTERNAL MEDICINE

## 2021-12-13 ASSESSMENT — MIFFLIN-ST. JEOR: SCORE: 1301.65

## 2021-12-13 ASSESSMENT — PAIN SCALES - GENERAL: PAINLEVEL: NO PAIN (0)

## 2021-12-13 NOTE — LETTER
12/13/2021     RE: Ag Galdamez  [address]  [address]  [facility] [address]    Dear Colleague,    Thank you for referring your patient, Ag Galdamez, to the [facility] [place]. Please see a copy of my visit note below.    Pulmonology Clinic Follow up Visit       Ag Galdamez MRN# [id number]   YOB: 1945 Age: 76 year old     Date of Visit: 12/13/2021    Reason for Visit: Follow-up COPD          Assessment and Plan:     ## COPD-very severe  Exacerbations past year: 0  Prior COPD hospitalizations: 1 (~2016, triggered by influenza)  Home action plan: none indicated  A1AT: not assessed, emphysema not prominent on CT but could consider in future  Home oxygen: No known indication. Sats mid 90's at home at rest. Will get 6MWT at next visit  Pulmonary rehab: referral placed.   Inhaler regimen:  Continue ICS/LAMA/LABA due to severity of obstruction on PFT. Cont PRN albuterol.   Tobacco cessation: quit ~2016, no relapse      He reports marked improvement in his symptoms since starting Trelegy inhaler will continue this  6-minute walk test was supposed to be scheduled but somehow did not happen so we will check 6-minute walk prior to his next visit.  He was referred to pulmonary rehab following his last visit, however he is not interested in pursuing this and will work to increase his exercise at home using an elliptical.      ## Health maintenance  #Vaccines he has very strong opinions about vaccines.  -We discussed the Covid vaccine, however he is concerned about its safety  -He is not interested in the influenza or pneumococcal vaccines.  He says that he has had influenza and pneumonia in the past and it was not that bad.    #Lung cancer screening  This was discussed his last visit, I discussed that again at this visit.  But given the severity of his COPD, age, and preference to avoid medical interventions I think it is appropriate  "that he declined further testing        Return to clinic: 6 months with Dr. Sherwin Sandoval M.D.  Pulmonary & Critical Care  Pager: Click Here to page          History of Present Illness / Interval History:     Ag Galdamez is a 76 year old male with H/O COPD presents for follow-up.    He was last seen in September to establish care for his COPD and was started on the Trelegy inhaler that time.  Since then he has noticed a substantial improvement in his breathing and exercise tolerance.  He is able to do all of his activities and recounts that he was able to carry [place] decorations up from the basement last week which is something he was not able to do last year.    Since his last visit he has not had any exacerbations or new infections.    Exposures:  Enjoys tinkering. 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   Was an   Builds/fixes things as a hobby and was building a plan. Previously held a pilots license.   No known asbestos exposure.  Tobacco: 60 pack year history, quit about 5 years ago.   Covid: Opts not to take many covid precautions so as to not live in fear. Thinks he likely had covid when his wife had it but was asymptomatic. Understands that covid is fatal in some but feels he would likely be fine.           Review of Systems:     Review of Systems   All other systems reviewed and are negative.           Physical Examination:     BP (!) 187/63   Pulse 82   Resp 17   Ht 1.689 m (5' 6.5\")   Wt 62.6 kg (138 lb)   SpO2 97%   BMI 21.94 kg/m        Physical Exam  Vitals and nursing note reviewed.   Constitutional:       Appearance: Normal appearance. He is normal weight.   Eyes:      Extraocular Movements: Extraocular movements intact.      Conjunctiva/sclera: Conjunctivae normal.      Pupils: Pupils are equal, round, and reactive to light.   Cardiovascular:      Rate and Rhythm: Normal rate and regular rhythm.      Heart sounds: No murmur " heard.      Pulmonary:      Effort: Pulmonary effort is normal.      Breath sounds: Normal breath sounds. No wheezing, rhonchi or rales.   Musculoskeletal:      Right lower leg: No edema.      Left lower leg: No edema.   Neurological:      Mental Status: He is alert.       Fingernails without clubbing            Data:     PFT: 12/13/2021      The FVC is within normal limits, the FEV1 and FEV1/FVC ratio are reduced.  The diffusion capacity is within normal limits, however the patient opacities not been corrected for the patient's hemoglobin.    IMPRESSION:  Obstructive lung disease, compared to previous there is significant provement in the FVC and FEV1.      All studies listed here were independently reviewed and interpreted by me today.         Medications:     Current Outpatient Medications   Medication     albuterol (PROAIR HFA/PROVENTIL HFA/VENTOLIN HFA) 108 (90 Base) MCG/ACT inhaler     aspirin (ASA) 81 MG EC tablet     aspirin-acetaminophen-caffeine (EXCEDRIN MIGRAINE) 250-250-65 MG per tablet     atorvastatin (LIPITOR) 10 MG tablet     Cyanocobalamin (VITAMIN B-12 PO)     Fluticasone-Umeclidin-Vilanterol (TRELEGY ELLIPTA) 100-62.5-25 MCG/INH oral inhaler     lisinopril (ZESTRIL) 10 MG tablet     multivitamin w/minerals (MULTI-VITAMIN) tablet     spacer (OPTICHAMBER KINGSLEY) holding chamber     vitamin C (ASCORBIC ACID) 500 MG tablet     Fluticasone-Umeclidin-Vilanterol (TRELEGY ELLIPTA) 100-62.5-25 MCG/INH oral inhaler     No current facility-administered medications for this visit.     Again, thank you for allowing me to participate in the care of your patient.      Sincerely,    Edgar Sandoval MD    The above note was dictated using voice recognition software and may include typographical errors. Please contact the author for any clarifications.

## 2021-12-13 NOTE — PROGRESS NOTES
Pulmonology Clinic Follow up Visit       Ag Galdamez MRN# [id number]   YOB: 1945 Age: 76 year old     Date of Visit: 12/13/2021    Reason for Visit: Follow-up COPD          Assessment and Plan:     ## COPD-very severe  Exacerbations past year: 0  Prior COPD hospitalizations: 1 (~2016, triggered by influenza)  Home action plan: none indicated  A1AT: not assessed, emphysema not prominent on CT but could consider in future  Home oxygen: No known indication. Sats mid 90's at home at rest. Will get 6MWT at next visit  Pulmonary rehab: referral placed.   Inhaler regimen:  Continue ICS/LAMA/LABA due to severity of obstruction on PFT. Cont PRN albuterol.   Tobacco cessation: quit ~2016, no relapse      He reports marked improvement in his symptoms since starting Trelegy inhaler will continue this  6-minute walk test was supposed to be scheduled but somehow did not happen so we will check 6-minute walk prior to his next visit.  He was referred to pulmonary rehab following his last visit, however he is not interested in pursuing this and will work to increase his exercise at home using an elliptical.      ## Health maintenance  #Vaccines he has very strong opinions about vaccines.  -We discussed the Covid vaccine, however he is concerned about its safety  -He is not interested in the influenza or pneumococcal vaccines.  He says that he has had influenza and pneumonia in the past and it was not that bad.    #Lung cancer screening  This was discussed his last visit, I discussed that again at this visit.  But given the severity of his COPD, age, and preference to avoid medical interventions I think it is appropriate that he declined further testing        Return to clinic: 6 months with Dr. Sherwin Sandoval M.D.  Pulmonary & Critical Care  Pager: Click Here to page          History of Present Illness / Interval History:     Ag Galdamez is a 76 year old male with H/O COPD presents for  "follow-up.    He was last seen in September to establish care for his COPD and was started on the Trelegy inhaler that time.  Since then he has noticed a substantial improvement in his breathing and exercise tolerance.  He is able to do all of his activities and recounts that he was able to carry [place] decorations up from the basement last week which is something he was not able to do last year.    Since his last visit he has not had any exacerbations or new infections.    Exposures:  Enjoys tinkering. 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   Was an   Builds/fixes things as a hobby and was building a plan. Previously held a pilots license.   No known asbestos exposure.  Tobacco: 60 pack year history, quit about 5 years ago.   Covid: Opts not to take many covid precautions so as to not live in fear. Thinks he likely had covid when his wife had it but was asymptomatic. Understands that covid is fatal in some but feels he would likely be fine.           Review of Systems:     Review of Systems   All other systems reviewed and are negative.           Physical Examination:     BP (!) 187/63   Pulse 82   Resp 17   Ht 1.689 m (5' 6.5\")   Wt 62.6 kg (138 lb)   SpO2 97%   BMI 21.94 kg/m        Physical Exam  Vitals and nursing note reviewed.   Constitutional:       Appearance: Normal appearance. He is normal weight.   Eyes:      Extraocular Movements: Extraocular movements intact.      Conjunctiva/sclera: Conjunctivae normal.      Pupils: Pupils are equal, round, and reactive to light.   Cardiovascular:      Rate and Rhythm: Normal rate and regular rhythm.      Heart sounds: No murmur heard.      Pulmonary:      Effort: Pulmonary effort is normal.      Breath sounds: Normal breath sounds. No wheezing, rhonchi or rales.   Musculoskeletal:      Right lower leg: No edema.      Left lower leg: No edema.   Neurological:      Mental Status: He is alert.       Fingernails without " clubbing            Data:     PFT: 12/13/2021      The FVC is within normal limits, the FEV1 and FEV1/FVC ratio are reduced.  The diffusion capacity is within normal limits, however the patient opacities not been corrected for the patient's hemoglobin.    IMPRESSION:  Obstructive lung disease, compared to previous there is significant provement in the FVC and FEV1.      All studies listed here were independently reviewed and interpreted by me today.         Medications:     Current Outpatient Medications   Medication     albuterol (PROAIR HFA/PROVENTIL HFA/VENTOLIN HFA) 108 (90 Base) MCG/ACT inhaler     aspirin (ASA) 81 MG EC tablet     aspirin-acetaminophen-caffeine (EXCEDRIN MIGRAINE) 250-250-65 MG per tablet     atorvastatin (LIPITOR) 10 MG tablet     Cyanocobalamin (VITAMIN B-12 PO)     Fluticasone-Umeclidin-Vilanterol (TRELEGY ELLIPTA) 100-62.5-25 MCG/INH oral inhaler     lisinopril (ZESTRIL) 10 MG tablet     multivitamin w/minerals (MULTI-VITAMIN) tablet     spacer (OPTICHAMBER KINGSLEY) holding chamber     vitamin C (ASCORBIC ACID) 500 MG tablet     Fluticasone-Umeclidin-Vilanterol (TRELEGY ELLIPTA) 100-62.5-25 MCG/INH oral inhaler     No current facility-administered medications for this visit.             The above note was dictated using voice recognition software and may include typographical errors. Please contact the author for any clarifications.

## 2021-12-13 NOTE — NURSING NOTE
Chief Complaint   Patient presents with     RECHECK     Return 3 month follow up    Medications reviewed and vital signs taken.   Gerhard Gonzales CMA

## 2021-12-14 LAB
DLCOUNC-%PRED-PRE: 75 %
DLCOUNC-PRE: 16.84 ML/MIN/MMHG
DLCOUNC-PRED: 22.42 ML/MIN/MMHG
ERV-%PRED-PRE: 29 %
ERV-PRE: 0.32 L
ERV-PRED: 1.1 L
EXPTIME-PRE: 11.17 SEC
FEF2575-%PRED-PRE: 16 %
FEF2575-PRE: 0.33 L/SEC
FEF2575-PRED: 1.98 L/SEC
FEFMAX-%PRED-PRE: 57 %
FEFMAX-PRE: 3.95 L/SEC
FEFMAX-PRED: 6.88 L/SEC
FEV1-%PRED-PRE: 38 %
FEV1-PRE: 1.03 L
FEV1FEV6-PRE: 46 %
FEV1FEV6-PRED: 77 %
FEV1FVC-PRE: 40 %
FEV1FVC-PRED: 76 %
FEV1SVC-PRE: 38 %
FEV1SVC-PRED: 66 %
FIFMAX-PRE: 5.03 L/SEC
FVC-%PRED-PRE: 72 %
FVC-PRE: 2.59 L
FVC-PRED: 3.56 L
IC-%PRED-PRE: 80 %
IC-PRE: 2.37 L
IC-PRED: 2.93 L
VA-%PRED-PRE: 94 %
VA-PRE: 5.3 L
VC-%PRED-PRE: 66 %
VC-PRE: 2.69 L
VC-PRED: 4.03 L

## 2022-08-31 DIAGNOSIS — R93.1 AGATSTON CORONARY ARTERY CALCIUM SCORE GREATER THAN 400: ICD-10-CM

## 2022-08-31 DIAGNOSIS — I70.0 AORTIC CALCIFICATION (H): ICD-10-CM

## 2022-08-31 DIAGNOSIS — I10 UNCONTROLLED HYPERTENSION: ICD-10-CM

## 2022-08-31 RX ORDER — ATORVASTATIN CALCIUM 10 MG/1
10 TABLET, FILM COATED ORAL DAILY
Qty: 90 TABLET | Refills: 0 | Status: SHIPPED | OUTPATIENT
Start: 2022-08-31 | End: 2022-11-28

## 2022-08-31 NOTE — TELEPHONE ENCOUNTER
[facility] Cardiology Refill Guideline reviewed.  Medication meets criteria for refill.     Melody Pressley RN

## 2022-08-31 NOTE — TELEPHONE ENCOUNTER
LF: 6/3/22  Qty: 90  Last Cardiology Visit: 9/9/21   Next Scheduled Cardiology Visit: none scheduled

## 2022-09-02 DIAGNOSIS — I10 UNCONTROLLED HYPERTENSION: ICD-10-CM

## 2022-09-02 RX ORDER — LISINOPRIL 10 MG/1
10 TABLET ORAL DAILY
Qty: 90 TABLET | Refills: 0 | Status: SHIPPED | OUTPATIENT
Start: 2022-09-02 | End: 2022-11-28

## 2022-09-02 NOTE — TELEPHONE ENCOUNTER
Pharmacy requesting for patient:   Lisinopril 10 mg tablet   Quantity:90   Last refill: 6/3/22  LOV: 9/9/21 None scheduled

## 2022-09-02 NOTE — TELEPHONE ENCOUNTER
[facility] Cardiology Refill Guideline reviewed.  Medication does not meet criteria for refill due to overdue for follow up.  Messaged to providers team for further review. 90 day refill sent and pt asked to call for appt. Mali Hester RN Cardiology September 2, 2022, 12:10 PM

## 2022-11-28 DIAGNOSIS — I10 UNCONTROLLED HYPERTENSION: ICD-10-CM

## 2022-11-28 DIAGNOSIS — I70.0 AORTIC CALCIFICATION (H): ICD-10-CM

## 2022-11-28 DIAGNOSIS — R93.1 AGATSTON CORONARY ARTERY CALCIUM SCORE GREATER THAN 400: ICD-10-CM

## 2022-11-28 RX ORDER — ATORVASTATIN CALCIUM 10 MG/1
10 TABLET, FILM COATED ORAL DAILY
Qty: 30 TABLET | Refills: 0 | Status: SHIPPED | OUTPATIENT
Start: 2022-11-28 | End: 2023-01-18

## 2022-11-28 RX ORDER — LISINOPRIL 10 MG/1
10 TABLET ORAL DAILY
Qty: 30 TABLET | Refills: 0 | Status: SHIPPED | OUTPATIENT
Start: 2022-11-28 | End: 2023-01-20

## 2022-11-28 NOTE — TELEPHONE ENCOUNTER
[facility] Cardiology Refill Guideline reviewed.  Medication does not meet criteria for refill due to overdue for follow up..  Messaged to providers team for further review. 30 day fill sent with message to call for appt. Mali Hester RN Cardiology November 28, 2022, 8:22 AM

## 2022-11-28 NOTE — TELEPHONE ENCOUNTER
Last Office Visit: 09/09/21 Dr. Hampton  Next Office Visit: TBD  Last Fill Date: 08/31/22  Sylvia Reagan MA Cardiology   11/28/2022 7:21 AM

## 2022-12-26 DIAGNOSIS — I10 UNCONTROLLED HYPERTENSION: ICD-10-CM

## 2022-12-26 DIAGNOSIS — I70.0 AORTIC CALCIFICATION (H): ICD-10-CM

## 2022-12-26 DIAGNOSIS — R93.1 AGATSTON CORONARY ARTERY CALCIUM SCORE GREATER THAN 400: ICD-10-CM

## 2022-12-26 NOTE — TELEPHONE ENCOUNTER
LF: 11/28/22  Qty: 30  Last Cardiology Visit: 9/9/21   Next Scheduled Cardiology Visit: none scheduled

## 2022-12-26 NOTE — TELEPHONE ENCOUNTER
[facility] Cardiology Refill Guideline reviewed.  Medication does not meet criteria for refill due to overdue for follow up.  Messaged to providers team for further review. Has been given stephanie refills and still not scheduled. Message to scheduling to call pt. No refills until scheduled. Mali Hester RN Cardiology December 26, 2022, 8:25 AM

## 2022-12-27 RX ORDER — LISINOPRIL 10 MG/1
10 TABLET ORAL DAILY
Qty: 30 TABLET | Refills: 0 | OUTPATIENT
Start: 2022-12-27

## 2022-12-27 RX ORDER — ATORVASTATIN CALCIUM 10 MG/1
10 TABLET, FILM COATED ORAL DAILY
Qty: 30 TABLET | Refills: 0 | OUTPATIENT
Start: 2022-12-27

## 2023-01-18 ENCOUNTER — TELEPHONE (OUTPATIENT)
Dept: CARDIOLOGY | Facility: CLINIC | Age: 78
End: 2023-01-18
Payer: MEDICARE

## 2023-01-18 DIAGNOSIS — I70.0 AORTIC CALCIFICATION (H): ICD-10-CM

## 2023-01-18 DIAGNOSIS — R93.1 AGATSTON CORONARY ARTERY CALCIUM SCORE GREATER THAN 400: ICD-10-CM

## 2023-01-18 DIAGNOSIS — I10 UNCONTROLLED HYPERTENSION: ICD-10-CM

## 2023-01-18 RX ORDER — ATORVASTATIN CALCIUM 10 MG/1
10 TABLET, FILM COATED ORAL DAILY
Qty: 30 TABLET | Refills: 0 | Status: SHIPPED | OUTPATIENT
Start: 2023-01-18 | End: 2023-02-13

## 2023-01-18 NOTE — TELEPHONE ENCOUNTER
[facility] Cardiology Refill Guideline reviewed.  Medication meets criteria for refill.     30 tabs given with no refills. Pt has not been seen in over a year. Scheduled for next visit 2/3/23.    Melody Pressley RN

## 2023-01-18 NOTE — TELEPHONE ENCOUNTER
[facility] Call Center    Phone Message    May a detailed message be left on voicemail: yes     Reason for Call: Medication Refill Request    Has the patient contacted the pharmacy for the refill? Yes   Name of medication being requested: atorvastatin (LIPITOR) 10 MG tablet    Provider who prescribed the medication: Memo  Pharmacy: [facility] [address] [address]  Date medication is needed: ASAP- pt is out and has been ill last few days- just needs a temporary fill until appt.     Action Taken: Other: cardio    Travel Screening: Not Applicable

## 2023-01-20 DIAGNOSIS — I10 UNCONTROLLED HYPERTENSION: ICD-10-CM

## 2023-01-20 RX ORDER — LISINOPRIL 10 MG/1
10 TABLET ORAL DAILY
Qty: 30 TABLET | Refills: 0 | Status: SHIPPED | OUTPATIENT
Start: 2023-01-20 | End: 2023-02-13

## 2023-01-20 NOTE — TELEPHONE ENCOUNTER
[facility] Cardiology Refill Guideline reviewed.  Medication meets criteria for refill. Pt has 2/3/23 visit with Brandi Marvin NP. Mali Hester RN Cardiology January 20, 2023, 9:52 AM

## 2023-02-03 ENCOUNTER — OFFICE VISIT (OUTPATIENT)
Dept: CARDIOLOGY | Facility: CLINIC | Age: 78
End: 2023-02-03
Payer: MEDICARE

## 2023-02-03 VITALS
BODY MASS INDEX: 20.83 KG/M2 | SYSTOLIC BLOOD PRESSURE: 138 MMHG | HEART RATE: 82 BPM | DIASTOLIC BLOOD PRESSURE: 70 MMHG | WEIGHT: 131 LBS | OXYGEN SATURATION: 97 %

## 2023-02-03 DIAGNOSIS — I10 BENIGN ESSENTIAL HYPERTENSION: ICD-10-CM

## 2023-02-03 DIAGNOSIS — E78.5 HYPERLIPIDEMIA LDL GOAL <70: ICD-10-CM

## 2023-02-03 DIAGNOSIS — R93.1 ELEVATED CORONARY ARTERY CALCIUM SCORE: Primary | ICD-10-CM

## 2023-02-03 PROCEDURE — 99214 OFFICE O/P EST MOD 30 MIN: CPT | Performed by: NURSE PRACTITIONER

## 2023-02-03 NOTE — LETTER
2/3/2023    Dago Stewart MD  [address] [facility] [address]    RE: Ag Galdamez       Dear Colleague,     I had the pleasure of seeing Ag Galdamez in the [facility].  Cardiology Clinic Progress Note  Ag Galdamez MRN# [id number]   YOB: 1945 Age: 77 year old      Primary Cardiologist:   Dr. Hampton          History of Presenting Illness:      Ag Galdamez is a pleasant 77 year old patient with a past cardiac history significant for   1. Elevated calcium score    CT calcium score in 2021 with a total of 685 mostly in the LAD (580)    Stress echo 9/2021 nondiagnostic  2. Hypertension  Past medical history significant for prior history of tobacco abuse 50 pack year history of with cessation 2015, COPD, thrombocytopenia.    Patient was seen by Dr. Hampton in consultation in September 2021 for elevated calcium score.  Patient had CT calcium score in 2021 with a total of 685 mostly in the LAD (580).  Patient was able to walk a mile daily without any cardiac complaints and denied any angina.  LDL at the time was 80. He was started on statin and aspirin.  Blood pressure was elevated and lisinopril was increased.    Pt presents today, with his wife julieta, for overdue annual follow-up. Most recent BMP was July 2021 showing normal renal function and electrolytes. Stress echo September 2021 patient was only able to exercise for 2-1/2 minutes up to 4.6 METS so testing was not diagnostic.  He had occasional PVCs, EF was 55 to 60%, no WMA.  This was reviewed by Dr. Hampton who recommended instead proceeding with a Lexiscan.  Given the patient did not have any symptoms he declined proceeding with testing.  Results reviewed today.    Blood pressure today was initially elevated but improved with recheck.  Prior home blood pressures 140s to 150 systolic but has not been checked in quite some time.  They will start checking home blood pressures again let us know if  these are elevated.      His wife tells me that their stress echocardiogram was not covered by insurance and that is why they were hesitant about scheduling a different stress test.  I again explained why we were recommending a Lexiscan and he is agreeable to proceeding with this.  He was previously able to walk 1 mile without any anginal symptoms, in 2021.  In the summer and fall 2022 she was only walking a half a mile and having dyspnea on exertion.  He is unsure if this was due to deconditioning, COPD, or his heart.  This winter, he has not been very active and denies any current cardiac complaints.  He is agreeable to having his annual lab work done.  I recommended following up with PCP as they were inquiring about rechecking his platelets with his history of thrombocytopenia. Patient reports no chest pain, PND, orthopnea, presyncope, syncope, edema, heart racing, or palpitations.    Labs:  LIPID RESULTS:  Lab Results   Component Value Date    CHOL 177 02/26/2010    HDL 54 02/26/2010    LDL 80 05/19/2021     02/26/2010    TRIG 86 02/26/2010    CHOLHDLRATIO 3.0 02/26/2010       LIVER ENZYME RESULTS:  Lab Results   Component Value Date    AST 20 07/14/2021    AST 23 05/19/2021    ALT 20 07/14/2021    ALT 19 05/19/2021       CBC RESULTS:  Lab Results   Component Value Date    WBC 6.8 07/14/2021    WBC 7.3 05/19/2021    RBC 4.50 07/14/2021    RBC 4.41 05/19/2021    HGB 14.6 07/14/2021    HGB 14.1 05/19/2021    HCT 43.3 07/14/2021    HCT 42.5 05/19/2021    MCV 96 07/14/2021    MCV 96 05/19/2021    MCH 32.4 07/14/2021    MCH 32.0 05/19/2021    MCHC 33.7 07/14/2021    MCHC 33.2 05/19/2021    RDW 14.1 07/14/2021    RDW 14.0 05/19/2021    PLT 71 (L) 07/14/2021    PLT 62 (L) 05/19/2021       BMP RESULTS:  Lab Results   Component Value Date     07/14/2021     05/19/2021    POTASSIUM 4.5 07/14/2021    POTASSIUM 4.1 05/19/2021    CHLORIDE 104 07/14/2021    CHLORIDE 102 05/19/2021    CO2 29 07/14/2021     CO2 30 05/19/2021    ANIONGAP 6 07/14/2021    ANIONGAP 3 05/19/2021    GLC 93 07/14/2021    GLC 86 05/19/2021    BUN 16 07/14/2021    BUN 13 05/19/2021    CR 1.01 07/14/2021    CR 0.99 05/19/2021    GFRESTIMATED 71 07/14/2021    GFRESTIMATED 73 05/19/2021    GFRESTBLACK 85 05/19/2021    MARIELY 9.3 07/14/2021    MARIELY 8.9 05/19/2021        A1C RESULTS:  No results found for: A1C    INR RESULTS:  Lab Results   Component Value Date    INR 1.17 (H) 02/09/2017       Results reviewed today.       Current Cardiac Medications     Atorvastatin 10 mg daily  Lisinopril 10 mg daily  Aspirin 81 mg daily                   Assessment and Plan:       Plan    Patient Instructions   Medication Changes:  3. None     Recommendations:  1. Check blood pressure at least 1 hour after medications. Call the clinic if your blood pressure is consistently greater than 130/80.      Follow-up:  1. Lexiscan nuclear stress test   2. Annual Fasting lab in 2 months (lipid/ALT)   3. See  Dr. Hampton for cardiology follow up at [facility]: Feb 2024.   Call 6 months prior, to schedule.     Cardiology Scheduling~[phone]  Cardiology Clinic RN~[phone] (Mali RN, Melody RN, Cristina RN)          1. Elevated calcium score    Nondiagnostic stress echo 2021    Plan for Lexiscan    Continue statin, aspirin, ACE inhibitor      2. Hypertension    Controlled    Continue lisinopril             Thank you for allowing me to participate in this delightful patient's care.      This note was completed in part using Dragon voice recognition software. Although reviewed after completion, some word and grammatical errors may occur.    Brandi Shahid, APRN CNP, APRN, CNP           Data:   All laboratory data reviewed             Constitutional:  cooperative, alert and oriented, well developed, well nourished, in no acute distress    Skin:  warm and dry to the touch         Head:  normocephalic       Eyes:  pupils equal and round       ENT:  no pallor or  cyanosis       Neck:  no stiffness       Respiratory:  clear to auscultation; normal symmetry        Cardiac: regular rate and rhythm; normal S1 and S2                 pulses full and equal     GI:  abdomen soft, nondistended     Extremities and Muscular Skeletal:  no edema        Neurological:  affect appropriate; no gross motor deficits       Psych:  Alert and Oriented x 3 , appropriate affact        Thank you for allowing me to participate in the care of your patient.      Sincerely,     ANA Coronel [facility]  cc:   No referring provider defined for this encounter.

## 2023-02-03 NOTE — PATIENT INSTRUCTIONS
Medication Changes:  None     Recommendations:  Check blood pressure at least 1 hour after medications. Call the clinic if your blood pressure is consistently greater than 130/80.      Follow-up:  Lexiscan nuclear stress test   Annual Fasting lab in 2 months (lipid/ALT)   See  Dr. Hampton for cardiology follow up at [facility]: Feb 2024.   Call 6 months prior, to schedule.     Cardiology Scheduling~[phone]  Cardiology Clinic RN~[phone] (Mali RN, Melody RN, Cristina RN)

## 2023-02-03 NOTE — NURSING NOTE
Chief Complaint   Patient presents with     Coronary Artery Disease     Follow up CAD,med refills       Vitals:    02/03/23 1547   Pulse: 82   SpO2: 97%   Weight: 59.4 kg (131 lb)     Wt Readings from Last 1 Encounters:   02/03/23 59.4 kg (131 lb)       Melissa Philip MA

## 2023-02-03 NOTE — PROGRESS NOTES
Cardiology Clinic Progress Note  Ag Galdamez MRN# [id number]   YOB: 1945 Age: 77 year old      Primary Cardiologist:   Dr. Hamtpon          History of Presenting Illness:      Ag Galdamez is a pleasant 77 year old patient with a past cardiac history significant for   1. Elevated calcium score    CT calcium score in 2021 with a total of 685 mostly in the LAD (580)    Stress echo 9/2021 nondiagnostic  2. Hypertension  Past medical history significant for prior history of tobacco abuse 50 pack year history of with cessation 2015, COPD, thrombocytopenia.    Patient was seen by Dr. Hampton in consultation in September 2021 for elevated calcium score.  Patient had CT calcium score in 2021 with a total of 685 mostly in the LAD (580).  Patient was able to walk a mile daily without any cardiac complaints and denied any angina.  LDL at the time was 80. He was started on statin and aspirin.  Blood pressure was elevated and lisinopril was increased.    Pt presents today, with his wife julieta, for overdue annual follow-up. Most recent BMP was July 2021 showing normal renal function and electrolytes. Stress echo September 2021 patient was only able to exercise for 2-1/2 minutes up to 4.6 METS so testing was not diagnostic.  He had occasional PVCs, EF was 55 to 60%, no WMA.  This was reviewed by Dr. Hampton who recommended instead proceeding with a Lexiscan.  Given the patient did not have any symptoms he declined proceeding with testing.  Results reviewed today.    Blood pressure today was initially elevated but improved with recheck.  Prior home blood pressures 140s to 150 systolic but has not been checked in quite some time.  They will start checking home blood pressures again let us know if these are elevated.      His wife tells me that their stress echocardiogram was not covered by insurance and that is why they were hesitant about scheduling a different stress test.  I again explained why we were  recommending a Lexiscan and he is agreeable to proceeding with this.  He was previously able to walk 1 mile without any anginal symptoms, in 2021.  In the summer and fall 2022 she was only walking a half a mile and having dyspnea on exertion.  He is unsure if this was due to deconditioning, COPD, or his heart.  This winter, he has not been very active and denies any current cardiac complaints.  He is agreeable to having his annual lab work done.  I recommended following up with PCP as they were inquiring about rechecking his platelets with his history of thrombocytopenia. Patient reports no chest pain, PND, orthopnea, presyncope, syncope, edema, heart racing, or palpitations.    Labs:  LIPID RESULTS:  Lab Results   Component Value Date    CHOL 177 02/26/2010    HDL 54 02/26/2010    LDL 80 05/19/2021     02/26/2010    TRIG 86 02/26/2010    CHOLHDLRATIO 3.0 02/26/2010       LIVER ENZYME RESULTS:  Lab Results   Component Value Date    AST 20 07/14/2021    AST 23 05/19/2021    ALT 20 07/14/2021    ALT 19 05/19/2021       CBC RESULTS:  Lab Results   Component Value Date    WBC 6.8 07/14/2021    WBC 7.3 05/19/2021    RBC 4.50 07/14/2021    RBC 4.41 05/19/2021    HGB 14.6 07/14/2021    HGB 14.1 05/19/2021    HCT 43.3 07/14/2021    HCT 42.5 05/19/2021    MCV 96 07/14/2021    MCV 96 05/19/2021    MCH 32.4 07/14/2021    MCH 32.0 05/19/2021    MCHC 33.7 07/14/2021    MCHC 33.2 05/19/2021    RDW 14.1 07/14/2021    RDW 14.0 05/19/2021    PLT 71 (L) 07/14/2021    PLT 62 (L) 05/19/2021       BMP RESULTS:  Lab Results   Component Value Date     07/14/2021     05/19/2021    POTASSIUM 4.5 07/14/2021    POTASSIUM 4.1 05/19/2021    CHLORIDE 104 07/14/2021    CHLORIDE 102 05/19/2021    CO2 29 07/14/2021    CO2 30 05/19/2021    ANIONGAP 6 07/14/2021    ANIONGAP 3 05/19/2021    GLC 93 07/14/2021    GLC 86 05/19/2021    BUN 16 07/14/2021    BUN 13 05/19/2021    CR 1.01 07/14/2021    CR 0.99 05/19/2021    GFRESTIMATED 71  07/14/2021    GFRESTIMATED 73 05/19/2021    GFRESTBLACK 85 05/19/2021    MARIELY 9.3 07/14/2021    MARIELY 8.9 05/19/2021        A1C RESULTS:  No results found for: A1C    INR RESULTS:  Lab Results   Component Value Date    INR 1.17 (H) 02/09/2017       Results reviewed today.       Current Cardiac Medications     Atorvastatin 10 mg daily  Lisinopril 10 mg daily  Aspirin 81 mg daily                   Assessment and Plan:       Plan    Patient Instructions   Medication Changes:  3. None     Recommendations:  1. Check blood pressure at least 1 hour after medications. Call the clinic if your blood pressure is consistently greater than 130/80.      Follow-up:  1. Lexiscan nuclear stress test   2. Annual Fasting lab in 2 months (lipid/ALT)   3. See  Dr. Hampton for cardiology follow up at [facility]: Feb 2024.   Call 6 months prior, to schedule.     Cardiology Scheduling~[phone]  Cardiology Clinic RN~[phone] (Mali RN, Melody RN, Cristina RN)          1. Elevated calcium score    Nondiagnostic stress echo 2021    Plan for Lexiscan    Continue statin, aspirin, ACE inhibitor      2. Hypertension    Controlled    Continue lisinopril             Thank you for allowing me to participate in this delightful patient's care.      This note was completed in part using Dragon voice recognition software. Although reviewed after completion, some word and grammatical errors may occur.    Brandi Shahid, APRN CNP, APRN, CNP           Data:   All laboratory data reviewed             Constitutional:  cooperative, alert and oriented, well developed, well nourished, in no acute distress    Skin:  warm and dry to the touch         Head:  normocephalic       Eyes:  pupils equal and round       ENT:  no pallor or cyanosis       Neck:  no stiffness       Respiratory:  clear to auscultation; normal symmetry        Cardiac: regular rate and rhythm; normal S1 and S2                 pulses full and equal     GI:  abdomen soft,  nondistended     Extremities and Muscular Skeletal:  no edema        Neurological:  affect appropriate; no gross motor deficits       Psych:  Alert and Oriented x 3 , appropriate affact

## 2023-02-13 DIAGNOSIS — I10 UNCONTROLLED HYPERTENSION: ICD-10-CM

## 2023-02-13 DIAGNOSIS — I70.0 AORTIC CALCIFICATION (H): ICD-10-CM

## 2023-02-13 DIAGNOSIS — R93.1 AGATSTON CORONARY ARTERY CALCIUM SCORE GREATER THAN 400: ICD-10-CM

## 2023-02-13 RX ORDER — ATORVASTATIN CALCIUM 10 MG/1
10 TABLET, FILM COATED ORAL DAILY
Qty: 90 TABLET | Refills: 3 | Status: SHIPPED | OUTPATIENT
Start: 2023-02-13 | End: 2024-03-06

## 2023-02-13 RX ORDER — LISINOPRIL 10 MG/1
10 TABLET ORAL DAILY
Qty: 90 TABLET | Refills: 3 | Status: SHIPPED | OUTPATIENT
Start: 2023-02-13 | End: 2024-03-06

## 2023-02-13 NOTE — TELEPHONE ENCOUNTER
Last Office Visit: 02/03/23 TELLO Marvin  Next Office Visit: TBD  Last Fill Date: 01/18/23  Sylvia Reagan MA Cardiology   2/13/2023 7:33 AM

## 2023-02-13 NOTE — TELEPHONE ENCOUNTER
[facility] Cardiology Refill Guideline reviewed.  Medication meets criteria for refill. 90 day supply refills sent. Mali Hester RN Cardiology February 13, 2023, 10:45 AM

## 2024-02-20 NOTE — TELEPHONE ENCOUNTER
Ag Chase has been found to have a high Coronary calcium score. Hence, he ha sbeen recommended to optimize coronary event prevention. However, he does hoave thrombocytopenia. In this setting, is use of low dose aspirin an absolute contraindication for nim? He does not see cardiology for another month.    Thank you!  
Dr. Stewart,    Wife calls afraid to give the ASA.  He has low platelets. Casandra PAGAN RN    
Forwarding to PCP as FYI.   Below response from Dr. Kendrick reviewed. Appears last platelet count was 71,000 on 7/14/21.  Returned call to patient/spouse and advice to continue low-dose aspirin given per recommendation with understanding voiced. Spouse Camila also reports that she's been having patient take B12 because he's had a problem with that in the past.  Last B12 result from 7/14/21 was elevated.  B12 not on med list, so RN advised he can probably stop this.  Will forward to PCP as FYI.    Debo Fenton RN  [facility]      Michel Kendrick MD Silva, Arvin Thomas Ipapo, MD 2 hours ago (8:44 AM)   MH  Continue aspirin 81 mg daily as long as platelet count more than 50,000.     Message text        
He can wait for now to start it until he sees cardiology in a month.    I will reach out to hematology regarding use of low dose Aspirin in light of his low platelets.  
Noted. Appreciate RN reachout to patient.    Appreciate Dr. Kendrick's input.    No additional advice at this time.  
Reason for Call:  Other prescription    Detailed comments: Camila is calling would like to talk with a RN about his medications that Stewart said he should take and what his other conditions if it is ok to take the Asprin.     Phone Number Patient can be reached at: Home number on file [phone] (home)    Best Time: any    Can we leave a detailed message on this number? YES    Call taken on 8/10/2021 at 12:19 PM by Anais Thomas      
Admission
The patient's psychiatric symptoms were consistent with the diagnosis on admission.

## 2024-03-06 DIAGNOSIS — I70.0 AORTIC CALCIFICATION (H): ICD-10-CM

## 2024-03-06 DIAGNOSIS — I10 UNCONTROLLED HYPERTENSION: ICD-10-CM

## 2024-03-06 DIAGNOSIS — R93.1 AGATSTON CORONARY ARTERY CALCIUM SCORE GREATER THAN 400: ICD-10-CM

## 2024-03-06 RX ORDER — LISINOPRIL 10 MG/1
10 TABLET ORAL DAILY
Qty: 90 TABLET | Refills: 0 | Status: SHIPPED | OUTPATIENT
Start: 2024-03-06

## 2024-03-06 RX ORDER — ATORVASTATIN CALCIUM 10 MG/1
10 TABLET, FILM COATED ORAL DAILY
Qty: 90 TABLET | Refills: 0 | Status: SHIPPED | OUTPATIENT
Start: 2024-03-06

## 2024-03-06 NOTE — TELEPHONE ENCOUNTER
Last Office Visit: 02/03/23 Dr. Hampton  Next Office Visit: TBD  Last Fill Date: 12/03/23  Sylvia Reagan MA Cardiology   3/6/2024 10:58 AM

## 2024-03-06 NOTE — TELEPHONE ENCOUNTER
[facility] Cardiology Refill Guideline reviewed.  Medication meets criteria for refill.    Pt given 90 day stephanie refill as pt was due to be seen 02/2024 with Dr Hampton.    Mallorie Burgess, RN

## 2024-04-16 ENCOUNTER — OFFICE VISIT (OUTPATIENT)
Dept: PULMONOLOGY | Facility: CLINIC | Age: 79
End: 2024-04-16
Attending: INTERNAL MEDICINE
Payer: MEDICARE

## 2024-04-16 VITALS — OXYGEN SATURATION: 93 % | DIASTOLIC BLOOD PRESSURE: 83 MMHG | SYSTOLIC BLOOD PRESSURE: 191 MMHG | HEART RATE: 68 BPM

## 2024-04-16 DIAGNOSIS — J44.9 COPD, SEVERE (H): ICD-10-CM

## 2024-04-16 PROCEDURE — 99214 OFFICE O/P EST MOD 30 MIN: CPT | Performed by: INTERNAL MEDICINE

## 2024-04-16 PROCEDURE — G0463 HOSPITAL OUTPT CLINIC VISIT: HCPCS | Performed by: INTERNAL MEDICINE

## 2024-04-16 RX ORDER — ALBUTEROL SULFATE 90 UG/1
2 AEROSOL, METERED RESPIRATORY (INHALATION) EVERY 4 HOURS PRN
Qty: 18 G | Refills: 11 | Status: SHIPPED | OUTPATIENT
Start: 2024-04-16 | End: 2024-07-31

## 2024-04-16 ASSESSMENT — ENCOUNTER SYMPTOMS
COUGH: 0
WHEEZING: 1
HEMOPTYSIS: 0
CHILLS: 0
FEVER: 0
SPUTUM PRODUCTION: 0
SHORTNESS OF BREATH: 1
WEIGHT LOSS: 0

## 2024-04-16 ASSESSMENT — PAIN SCALES - GENERAL: PAINLEVEL: NO PAIN (0)

## 2024-04-16 NOTE — NURSING NOTE
Chief Complaint   Patient presents with    Follow Up     Return appt      Vitals were taken and medications were reconciled.     Lara Blankenship RMA  2:47 PM

## 2024-04-16 NOTE — LETTER
4/16/2024         RE: Ag Galdamez  [address]  [address]  [place] [address]        Dear Colleague,    Thank you for referring your patient, Ag Galdamez, to the [facility] [place]. Please see a copy of my visit note below.    Pulmonology Clinic Follow up Visit       Ag Galdamez MRN# [id number]   YOB: 1945 Age: 79 year old     Date of Visit: 4/16/2024    Reason for Visit: Follow-up COPD          Assessment and Plan:     ## COPD-very severe  Exacerbations past year: 0  Prior COPD hospitalizations: 1 (~2016, triggered by influenza)  Home action plan: none indicated  A1AT: not assessed, emphysema not prominent on CT but could consider in future  Home oxygen: No known indication. Sats mid 90's at home at rest. Will get 6MWT at next visit  Pulmonary rehab: referral placed.   Inhaler regimen:  Continue ICS/LAMA/LABA due to severity of obstruction on PFT. Cont PRN albuterol.   Tobacco cessation: quit ~2016, no relapse    At his last visit which was in 2021 he noted marked improvement in his breathing following initiation of Trelegy.  However, since that time he stopped using the Trelegy and noted worsening in his breathing with frequent wheezing and dyspnea.  He restarted his Trelegy 2 days ago and again noted marked improvement in his symptoms.     -Restart Trelegy.  Per [facility] this is well covered by his insurance, but if the medication is expensive we will change him to an alternate triple therapy  -Continue albuterol as needed  -6-minute walk on follow-up to assess oxygenation        ## Health maintenance  #Vaccines he has very strong opinions about vaccines.  We have previously discussed vaccines and he is adamant that he is not interested in vaccination.       #Lung cancer screening  Discussed at previous visits, and again this visit, however he is not interested in lung cancer screening.  Given the severity of his COPD,  "age, and preference to avoid medical interventions I think it is appropriate that he declined further testing      #Hypertension  He self discontinued his lisinopril and blood pressure today was 191 systolic.  He denies any symptoms of hypertension such as headache, dizziness, or vision changes.  I recommended that he restart his lisinopril and follow-up with his cardiologist.        Return to clinic: 3 months        Edgar Sandoval M.D.  Pulmonary & Critical Care  Pager: Click Here to page          History of Present Illness / Interval History:     Ag Galdamez is a 79 year old male with H/O COPD presents for follow-up.     Last seen: 12/13/2021    Feels that breathign has been \"pretty fair\" until the past few weeks with worsening over the last 2 days.    Specifically he reports increased dyspnea following large meals or with exertion as well as frequent wheezing and dyspnea.  He has not been seen since 2021.  At that time he was doing well on Trelegy, but self discontinued this medication.  He still had some leftover and started taking it again 2 days ago with significant improvement in his breathing and resolution of his wheezing.  He reports that his \"lungs feel way better\" after restarting Trelegy.    He has a cough that comes and goes.  He recounts an episode a few months ago where he had a productive cough every morning for a few weeks, but has not had trouble with coughing in the past few months.            Review of Systems:     Review of Systems   Constitutional:  Negative for chills, fever, malaise/fatigue and weight loss.   Respiratory:  Positive for shortness of breath and wheezing. Negative for cough, hemoptysis and sputum production.             Physical Examination:     BP (!) 191/83 (BP Location: Right arm, Patient Position: Sitting, Cuff Size: Adult Regular)   Pulse 68   SpO2 93%     Physical Exam  Vitals and nursing note reviewed.   Constitutional:       Appearance: Normal appearance. "   Cardiovascular:      Rate and Rhythm: Normal rate and regular rhythm.      Heart sounds: No murmur heard.  Pulmonary:      Effort: Pulmonary effort is normal.      Breath sounds: Normal breath sounds. No wheezing, rhonchi or rales.   Neurological:      Mental Status: He is alert.       Fingernails without clubbing        Data:     No pertinent new data since last seen.          Medications:     Current Outpatient Medications   Medication Sig Dispense Refill    albuterol (PROAIR HFA/PROVENTIL HFA/VENTOLIN HFA) 108 (90 Base) MCG/ACT inhaler Inhale 2 puffs into the lungs every 4 hours as needed for shortness of breath / dyspnea 18 g 11    aspirin (ASA) 81 MG EC tablet Take 1 tablet (81 mg) by mouth daily (Patient not taking: Reported on 2/3/2023) 90 tablet 3    aspirin-acetaminophen-caffeine (EXCEDRIN MIGRAINE) 250-250-65 MG per tablet Take 2 tablets by mouth every 8 hours as needed for headaches  (Patient not taking: Reported on 2/3/2023)      atorvastatin (LIPITOR) 10 MG tablet Take 1 tablet (10 mg) by mouth daily No further refills until seen by cardiology--call [phone] to schedule 90 tablet 0    Cyanocobalamin (VITAMIN B-12 PO) Take 1 tablet by mouth daily      Fluticasone-Umeclidin-Vilanterol (TRELEGY ELLIPTA) 100-62.5-25 MCG/INH oral inhaler Inhale 1 puff into the lungs daily (Patient not taking: Reported on 2/3/2023) 1 each 11    Fluticasone-Umeclidin-Vilanterol (TRELEGY ELLIPTA) 100-62.5-25 MCG/INH oral inhaler Inhale 1 puff into the lungs daily (Patient not taking: Reported on 2/3/2023) 3 each 3    lisinopril (ZESTRIL) 10 MG tablet Take 1 tablet (10 mg) by mouth daily No further refills until seen by cardiology--call [phone] to schedule 90 tablet 0    multivitamin w/minerals (THERA-VIT-M) tablet Take 1 tablet by mouth daily      spacer (OPTICHAMBER KINGSLEY) holding chamber Use with inhaler (Patient not taking: Reported on 2/3/2023) 1 each 0    vitamin C (ASCORBIC ACID) 500 MG tablet Take 500 mg by  mouth       No current facility-administered medications for this visit.       The above note was dictated using voice recognition software and may include typographical errors. Please contact the author for any clarifications.      Again, thank you for allowing me to participate in the care of your patient.        Sincerely,        Edgar Sandoval MD

## 2024-04-16 NOTE — PROGRESS NOTES
Pulmonology Clinic Follow up Visit       Ag Galdamez MRN# [id number]   YOB: 1945 Age: 79 year old     Date of Visit: 4/16/2024    Reason for Visit: Follow-up COPD          Assessment and Plan:     ## COPD-very severe  Exacerbations past year: 0  Prior COPD hospitalizations: 1 (~2016, triggered by influenza)  Home action plan: none indicated  A1AT: not assessed, emphysema not prominent on CT but could consider in future  Home oxygen: No known indication. Sats mid 90's at home at rest. Will get 6MWT at next visit  Pulmonary rehab: referral placed.   Inhaler regimen:  Continue ICS/LAMA/LABA due to severity of obstruction on PFT. Cont PRN albuterol.   Tobacco cessation: quit ~2016, no relapse    At his last visit which was in 2021 he noted marked improvement in his breathing following initiation of Trelegy.  However, since that time he stopped using the Trelegy and noted worsening in his breathing with frequent wheezing and dyspnea.  He restarted his Trelegy 2 days ago and again noted marked improvement in his symptoms.     -Restart Trelegy.  Per [facility] this is well covered by his insurance, but if the medication is expensive we will change him to an alternate triple therapy  -Continue albuterol as needed  -6-minute walk on follow-up to assess oxygenation        ## Health maintenance  #Vaccines he has very strong opinions about vaccines.  We have previously discussed vaccines and he is adamant that he is not interested in vaccination.       #Lung cancer screening  Discussed at previous visits, and again this visit, however he is not interested in lung cancer screening.  Given the severity of his COPD, age, and preference to avoid medical interventions I think it is appropriate that he declined further testing      #Hypertension  He self discontinued his lisinopril and blood pressure today was 191 systolic.  He denies any symptoms of hypertension such as headache, dizziness, or vision changes.  I  "recommended that he restart his lisinopril and follow-up with his cardiologist.        Return to clinic: 3 months        Edgar Sandoval M.D.  Pulmonary & Critical Care  Pager: Click Here to page          History of Present Illness / Interval History:     Ag Galdamez is a 79 year old male with H/O COPD presents for follow-up.     Last seen: 12/13/2021    Feels that breathign has been \"pretty fair\" until the past few weeks with worsening over the last 2 days.    Specifically he reports increased dyspnea following large meals or with exertion as well as frequent wheezing and dyspnea.  He has not been seen since 2021.  At that time he was doing well on Trelegy, but self discontinued this medication.  He still had some leftover and started taking it again 2 days ago with significant improvement in his breathing and resolution of his wheezing.  He reports that his \"lungs feel way better\" after restarting Trelegy.    He has a cough that comes and goes.  He recounts an episode a few months ago where he had a productive cough every morning for a few weeks, but has not had trouble with coughing in the past few months.            Review of Systems:     Review of Systems   Constitutional:  Negative for chills, fever, malaise/fatigue and weight loss.   Respiratory:  Positive for shortness of breath and wheezing. Negative for cough, hemoptysis and sputum production.             Physical Examination:     BP (!) 191/83 (BP Location: Right arm, Patient Position: Sitting, Cuff Size: Adult Regular)   Pulse 68   SpO2 93%     Physical Exam  Vitals and nursing note reviewed.   Constitutional:       Appearance: Normal appearance.   Cardiovascular:      Rate and Rhythm: Normal rate and regular rhythm.      Heart sounds: No murmur heard.  Pulmonary:      Effort: Pulmonary effort is normal.      Breath sounds: Normal breath sounds. No wheezing, rhonchi or rales.   Neurological:      Mental Status: He is alert.       Fingernails " without clubbing        Data:     No pertinent new data since last seen.          Medications:     Current Outpatient Medications   Medication Sig Dispense Refill    albuterol (PROAIR HFA/PROVENTIL HFA/VENTOLIN HFA) 108 (90 Base) MCG/ACT inhaler Inhale 2 puffs into the lungs every 4 hours as needed for shortness of breath / dyspnea 18 g 11    aspirin (ASA) 81 MG EC tablet Take 1 tablet (81 mg) by mouth daily (Patient not taking: Reported on 2/3/2023) 90 tablet 3    aspirin-acetaminophen-caffeine (EXCEDRIN MIGRAINE) 250-250-65 MG per tablet Take 2 tablets by mouth every 8 hours as needed for headaches  (Patient not taking: Reported on 2/3/2023)      atorvastatin (LIPITOR) 10 MG tablet Take 1 tablet (10 mg) by mouth daily No further refills until seen by cardiology--call [phone] to schedule 90 tablet 0    Cyanocobalamin (VITAMIN B-12 PO) Take 1 tablet by mouth daily      Fluticasone-Umeclidin-Vilanterol (TRELEGY ELLIPTA) 100-62.5-25 MCG/INH oral inhaler Inhale 1 puff into the lungs daily (Patient not taking: Reported on 2/3/2023) 1 each 11    Fluticasone-Umeclidin-Vilanterol (TRELEGY ELLIPTA) 100-62.5-25 MCG/INH oral inhaler Inhale 1 puff into the lungs daily (Patient not taking: Reported on 2/3/2023) 3 each 3    lisinopril (ZESTRIL) 10 MG tablet Take 1 tablet (10 mg) by mouth daily No further refills until seen by cardiology--call [phone] to schedule 90 tablet 0    multivitamin w/minerals (THERA-VIT-M) tablet Take 1 tablet by mouth daily      spacer (OPTICHAMBER KINGSLEY) holding chamber Use with inhaler (Patient not taking: Reported on 2/3/2023) 1 each 0    vitamin C (ASCORBIC ACID) 500 MG tablet Take 500 mg by mouth       No current facility-administered medications for this visit.             The above note was dictated using voice recognition software and may include typographical errors. Please contact the author for any clarifications.

## 2024-04-18 DIAGNOSIS — J44.9 COPD, SEVERE (H): ICD-10-CM

## 2024-04-18 RX ORDER — FLUTICASONE FUROATE, UMECLIDINIUM BROMIDE AND VILANTEROL TRIFENATATE 100; 62.5; 25 UG/1; UG/1; UG/1
1 POWDER RESPIRATORY (INHALATION) DAILY
Refills: 5 | OUTPATIENT
Start: 2024-04-18

## 2024-04-18 NOTE — TELEPHONE ENCOUNTER
Talked with pharmacist to try to find cheaper alternatives to Trelegy. It seems the patient will have to meet $500 deductible and all alternatives are similar to Trelegy.       Writer spoke with patient's wife. She is going to ask pharmacy which medication is less expensive after deductible (Trelegy or Breztri). She will call back if they need an order for Breztri.

## 2024-07-31 ENCOUNTER — OFFICE VISIT (OUTPATIENT)
Dept: PULMONOLOGY | Facility: CLINIC | Age: 79
End: 2024-07-31
Attending: INTERNAL MEDICINE
Payer: MEDICARE

## 2024-07-31 VITALS
HEART RATE: 83 BPM | BODY MASS INDEX: 20.37 KG/M2 | DIASTOLIC BLOOD PRESSURE: 80 MMHG | WEIGHT: 129.8 LBS | OXYGEN SATURATION: 97 % | SYSTOLIC BLOOD PRESSURE: 163 MMHG | RESPIRATION RATE: 17 BRPM | HEIGHT: 67 IN

## 2024-07-31 DIAGNOSIS — J44.9 COPD, SEVERE (H): ICD-10-CM

## 2024-07-31 LAB
6 MIN WALK (FT): 1120 FT
6 MIN WALK (M): 341 M

## 2024-07-31 PROCEDURE — G2211 COMPLEX E/M VISIT ADD ON: HCPCS | Performed by: INTERNAL MEDICINE

## 2024-07-31 PROCEDURE — G0463 HOSPITAL OUTPT CLINIC VISIT: HCPCS | Performed by: INTERNAL MEDICINE

## 2024-07-31 PROCEDURE — 94618 PULMONARY STRESS TESTING: CPT | Performed by: INTERNAL MEDICINE

## 2024-07-31 PROCEDURE — 99213 OFFICE O/P EST LOW 20 MIN: CPT | Performed by: INTERNAL MEDICINE

## 2024-07-31 RX ORDER — ALBUTEROL SULFATE 90 UG/1
2 AEROSOL, METERED RESPIRATORY (INHALATION) EVERY 4 HOURS PRN
Qty: 18 G | Refills: 11 | Status: SHIPPED | OUTPATIENT
Start: 2024-07-31

## 2024-07-31 ASSESSMENT — ENCOUNTER SYMPTOMS
COUGH: 0
WHEEZING: 0
SPUTUM PRODUCTION: 0
CHILLS: 0
FEVER: 0
WEIGHT LOSS: 0
HEMOPTYSIS: 0
SHORTNESS OF BREATH: 1

## 2024-07-31 ASSESSMENT — PAIN SCALES - GENERAL: PAINLEVEL: NO PAIN (0)

## 2024-07-31 NOTE — PROGRESS NOTES
Ag Galdamez comes into clinic today at the request of Dr. Sandoval Ordering Provider for 6 minute walk      Ham Salvador , RRT

## 2024-07-31 NOTE — NURSING NOTE
Chief Complaint   Patient presents with    RECHECK     Return Pulmonary     Medications reviewed and vital signs taken.   Gerhard Gonzales, ANDI

## 2024-07-31 NOTE — LETTER
7/31/2024      Ag Galdamez  [address]  [address]  [place] [address]      Dear Colleague,    Thank you for referring your patient, Ag Galdamez, to the [facility] FOR LUNG SCIENCE AND HEALTH [facility]. Please see a copy of my visit note below.      [facility] [address] [address]  Phone: [phone]  Fax: [phone]          Pulmonology Clinic Follow up Visit       Ag Galdamez MRN# [id number]   YOB: 1945 Age: 79 year old     Date of Visit: 7/31/2024    Reason for Visit: Follow-up COPD          Assessment and Plan:     ## COPD-very severe  Exacerbations past year: 0  Prior COPD hospitalizations: 1 (~2016, triggered by influenza)  Home action plan: none indicated  A1AT: not assessed, emphysema not prominent on CT but could consider in future  Home oxygen: No known indication. Sats mid 90's at home at rest. Will get 6MWT at next visit  Pulmonary rehab: referral placed.   Inhaler regimen:  Continue ICS/LAMA/LABA due to severity of obstruction on PFT. Cont PRN albuterol.   Tobacco cessation: quit ~2016, no relapse     He previously followed with us, then was lost to follow-up.  He initially noted marked improvement after starting Trelegy, but then ran out.  When he reinitiated in April 2024 Trelegy was restarted with noticeable improvement in his breathing.    6-minute walk 7/31/2024 with desaturation to an O2 miguel of 91 but no hypoxia.    Trelegy about $100 per fill, breztri the same.     -Continue trelegy  -Continue albuterol as needed          ## Health maintenance  #Vaccines he has very strong opinions about vaccines.  We have previously discussed vaccines and he is adamant that he is not interested in vaccination.        #Lung cancer screening  Discussed at previous visits, and again this visit, however he is not interested in lung cancer screening.  Given  "the severity of his COPD, age, and preference to avoid medical interventions I think it is appropriate that he declined further testing        #Hypertension  He self discontinued his lisinopril and blood pressure today was 136/80.  He denies any symptoms of hypertension such as headache, dizziness, or vision changes.  He reports that he is now taking his lisinopril.  I recommended that he discuss this with his cardiologist.  1 year      Return to clinic: 1 year        Edgar Sandoval M.D.  Pulmonary & Critical Care  Pager: Click Here to page          History of Present Illness / Interval History:     Ag Galdamez is a 79 year old male with H/O COPD presents for follow-up.      Last seen: 4/16/2024    Still on trelegy, seems to be workin well.  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  He denies coughing or wheezing.  Currently using his albuterol twice a day, but not really noticing much symptomatic improvement from that.            Review of Systems:     Review of Systems   Constitutional:  Negative for chills, fever and weight loss.   Respiratory:  Positive for shortness of breath. Negative for cough, hemoptysis, sputum production and wheezing.             Physical Examination:     BP (!) 163/80   Pulse 83   Resp 17   Ht 1.689 m (5' 6.5\")   Wt 58.9 kg (129 lb 12.8 oz)   SpO2 97%   BMI 20.64 kg/m      Physical Exam  Vitals and nursing note reviewed.   Constitutional:       Appearance: Normal appearance.   Cardiovascular:      Rate and Rhythm: Normal rate and regular rhythm.      Heart sounds: No murmur heard.  Pulmonary:      Effort: Pulmonary effort is normal.      Breath sounds: Normal breath sounds. No wheezing, rhonchi or rales.   Neurological:      Mental Status: He is alert.       Fingernails without clubbing            Data:     6 min walk: 7/31/2024  Walk " distance reduced with desaturation to an O2 miguel of 91% but no hypoxia.      All studies listed here were independently reviewed and interpreted by me today.         Medications:     Current Outpatient Medications   Medication Sig Dispense Refill     albuterol (PROAIR HFA/PROVENTIL HFA/VENTOLIN HFA) 108 (90 Base) MCG/ACT inhaler Inhale 2 puffs into the lungs every 4 hours as needed for shortness of breath 18 g 11     aspirin (ASA) 81 MG EC tablet Take 1 tablet (81 mg) by mouth daily (Patient not taking: Reported on 2/3/2023) 90 tablet 3     aspirin-acetaminophen-caffeine (EXCEDRIN MIGRAINE) 250-250-65 MG per tablet Take 2 tablets by mouth every 8 hours as needed for headaches  (Patient not taking: Reported on 2/3/2023)       atorvastatin (LIPITOR) 10 MG tablet Take 1 tablet (10 mg) by mouth daily No further refills until seen by cardiology--call [phone] to schedule 90 tablet 0     Cyanocobalamin (VITAMIN B-12 PO) Take 1 tablet by mouth daily       Fluticasone-Umeclidin-Vilant (TRELEGY ELLIPTA) 100-62.5-25 MCG/ACT oral inhaler Inhale 1 puff into the lungs daily 3 each 5     lisinopril (ZESTRIL) 10 MG tablet Take 1 tablet (10 mg) by mouth daily No further refills until seen by cardiology--call [phone] to schedule 90 tablet 0     multivitamin w/minerals (THERA-VIT-M) tablet Take 1 tablet by mouth daily       spacer (OPTICHAMBER KINGSLEY) holding chamber Use with inhaler (Patient not taking: Reported on 2/3/2023) 1 each 0     vitamin C (ASCORBIC ACID) 500 MG tablet Take 500 mg by mouth       No current facility-administered medications for this visit.             The above note was dictated using voice recognition software and may include typographical errors. Please contact the author for any clarifications.        Again, thank you for allowing me to participate in the care of your patient.        Sincerely,        Edgar Sandoval MD

## 2024-07-31 NOTE — PROGRESS NOTES
[facility] [address] [address]  Phone: [phone]  Fax: [phone]          Pulmonology Clinic Follow up Visit       Ag Galdamez MRN# [id number]   YOB: 1945 Age: 79 year old     Date of Visit: 7/31/2024    Reason for Visit: Follow-up COPD          Assessment and Plan:     ## COPD-very severe  Exacerbations past year: 0  Prior COPD hospitalizations: 1 (~2016, triggered by influenza)  Home action plan: none indicated  A1AT: not assessed, emphysema not prominent on CT but could consider in future  Home oxygen: No known indication. Sats mid 90's at home at rest. Will get 6MWT at next visit  Pulmonary rehab: referral placed.   Inhaler regimen:  Continue ICS/LAMA/LABA due to severity of obstruction on PFT. Cont PRN albuterol.   Tobacco cessation: quit ~2016, no relapse     He previously followed with us, then was lost to follow-up.  He initially noted marked improvement after starting Trelegy, but then ran out.  When he reinitiated in April 2024 Trelegy was restarted with noticeable improvement in his breathing.    6-minute walk 7/31/2024 with desaturation to an O2 miguel of 91 but no hypoxia.    Trelegy about $100 per fill, breztri the same.     -Continue trelegy  -Continue albuterol as needed          ## Health maintenance  #Vaccines he has very strong opinions about vaccines.  We have previously discussed vaccines and he is adamant that he is not interested in vaccination.        #Lung cancer screening  Discussed at previous visits, and again this visit, however he is not interested in lung cancer screening.  Given the severity of his COPD, age, and preference to avoid medical interventions I think it is appropriate that he declined further testing        #Hypertension  He self discontinued his lisinopril and blood pressure today was 136/80.  He denies any symptoms of hypertension such as headache,  "dizziness, or vision changes.  He reports that he is now taking his lisinopril.  I recommended that he discuss this with his cardiologist.  1 year      Return to clinic: 1 year    The longitudinal plan of care for the diagnosis(es)/condition(s) as documented were addressed during this visit. Due to the added complexity in care, I will continue to support Ag in the subsequent management and with ongoing continuity of care.    Edgar Sandoval M.D.  Pulmonary & Critical Care  Pager: Click Here to page          History of Present Illness / Interval History:     Ag Galdamez is a 79 year old male with H/O COPD presents for follow-up.      Last seen: 4/16/2024    Still on trelegy, seems to be workin well.  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  He denies coughing or wheezing.  Currently using his albuterol twice a day, but not really noticing much symptomatic improvement from that.            Review of Systems:     Review of Systems   Constitutional:  Negative for chills, fever and weight loss.   Respiratory:  Positive for shortness of breath. Negative for cough, hemoptysis, sputum production and wheezing.             Physical Examination:     BP (!) 163/80   Pulse 83   Resp 17   Ht 1.689 m (5' 6.5\")   Wt 58.9 kg (129 lb 12.8 oz)   SpO2 97%   BMI 20.64 kg/m      Physical Exam  Vitals and nursing note reviewed.   Constitutional:       Appearance: Normal appearance.   Cardiovascular:      Rate and Rhythm: Normal rate and regular rhythm.      Heart sounds: No murmur heard.  Pulmonary:      Effort: Pulmonary effort is normal.      Breath sounds: Normal breath sounds. No wheezing, rhonchi or rales.   Neurological:      Mental Status: He is alert.       Fingernails without clubbing            Data:     6 min walk: 7/31/2024  Walk distance reduced with desaturation to an " O2 miguel of 91% but no hypoxia.      All studies listed here were independently reviewed and interpreted by me today.         Medications:     Current Outpatient Medications   Medication Sig Dispense Refill    albuterol (PROAIR HFA/PROVENTIL HFA/VENTOLIN HFA) 108 (90 Base) MCG/ACT inhaler Inhale 2 puffs into the lungs every 4 hours as needed for shortness of breath 18 g 11    aspirin (ASA) 81 MG EC tablet Take 1 tablet (81 mg) by mouth daily (Patient not taking: Reported on 2/3/2023) 90 tablet 3    aspirin-acetaminophen-caffeine (EXCEDRIN MIGRAINE) 250-250-65 MG per tablet Take 2 tablets by mouth every 8 hours as needed for headaches  (Patient not taking: Reported on 2/3/2023)      atorvastatin (LIPITOR) 10 MG tablet Take 1 tablet (10 mg) by mouth daily No further refills until seen by cardiology--call [phone] to schedule 90 tablet 0    Cyanocobalamin (VITAMIN B-12 PO) Take 1 tablet by mouth daily      Fluticasone-Umeclidin-Vilant (TRELEGY ELLIPTA) 100-62.5-25 MCG/ACT oral inhaler Inhale 1 puff into the lungs daily 3 each 5    lisinopril (ZESTRIL) 10 MG tablet Take 1 tablet (10 mg) by mouth daily No further refills until seen by cardiology--call [phone] to schedule 90 tablet 0    multivitamin w/minerals (THERA-VIT-M) tablet Take 1 tablet by mouth daily      spacer (OPTICHAMBER KINGSLEY) holding chamber Use with inhaler (Patient not taking: Reported on 2/3/2023) 1 each 0    vitamin C (ASCORBIC ACID) 500 MG tablet Take 500 mg by mouth       No current facility-administered medications for this visit.             The above note was dictated using voice recognition software and may include typographical errors. Please contact the author for any clarifications.

## 2024-08-01 LAB — FIO2-PRE: 21 %

## 2025-07-01 ENCOUNTER — OFFICE VISIT (OUTPATIENT)
Dept: PULMONOLOGY | Facility: CLINIC | Age: 80
End: 2025-07-01
Attending: INTERNAL MEDICINE
Payer: MEDICARE

## 2025-07-01 VITALS
WEIGHT: 140 LBS | DIASTOLIC BLOOD PRESSURE: 84 MMHG | BODY MASS INDEX: 22.26 KG/M2 | OXYGEN SATURATION: 95 % | HEART RATE: 96 BPM | SYSTOLIC BLOOD PRESSURE: 170 MMHG

## 2025-07-01 DIAGNOSIS — J44.9 COPD, SEVERE (H): ICD-10-CM

## 2025-07-01 PROCEDURE — 3077F SYST BP >= 140 MM HG: CPT | Performed by: INTERNAL MEDICINE

## 2025-07-01 PROCEDURE — G0463 HOSPITAL OUTPT CLINIC VISIT: HCPCS | Performed by: INTERNAL MEDICINE

## 2025-07-01 PROCEDURE — 99214 OFFICE O/P EST MOD 30 MIN: CPT | Performed by: INTERNAL MEDICINE

## 2025-07-01 PROCEDURE — 3079F DIAST BP 80-89 MM HG: CPT | Performed by: INTERNAL MEDICINE

## 2025-07-01 PROCEDURE — 1126F AMNT PAIN NOTED NONE PRSNT: CPT | Performed by: INTERNAL MEDICINE

## 2025-07-01 RX ORDER — ALBUTEROL SULFATE 90 UG/1
2 INHALANT RESPIRATORY (INHALATION) EVERY 4 HOURS PRN
Qty: 18 G | Refills: 11 | Status: SHIPPED | OUTPATIENT
Start: 2025-07-01

## 2025-07-01 RX ORDER — FLUTICASONE FUROATE, UMECLIDINIUM BROMIDE AND VILANTEROL TRIFENATATE 200; 62.5; 25 UG/1; UG/1; UG/1
1 POWDER RESPIRATORY (INHALATION) DAILY
Qty: 60 EACH | Refills: 5 | Status: SHIPPED | OUTPATIENT
Start: 2025-07-01

## 2025-07-01 ASSESSMENT — PAIN SCALES - GENERAL: PAINLEVEL_OUTOF10: NO PAIN (0)

## 2025-07-01 NOTE — NURSING NOTE
Chief Complaint   Patient presents with    Follow Up     Return Pulmonary        Vitals were taken, medications reconciled.    Danni Dobbs, Clinic Assistant   9:58 AM

## 2025-07-01 NOTE — PROGRESS NOTES
[facility] CLIN[facility] [address] [address]  Phone: [phone]  Fax: [phone]          Pulmonology Clinic Follow up Visit       Ag Galdamez MRN# [id number]   YOB: 1945 Age: 80 year old     Date of Visit: 7/1/2025    Reason for Visit: Follow-up COPD          Assessment and Plan:     ## COPD / asthma overlap -very severe  Exacerbations past year: 0  Prior COPD hospitalizations: 1 (~2016, triggered by influenza)  Home action plan: none indicated  A1AT: not assessed, emphysema not prominent on CT but could consider in future  Home oxygen: No known indication. Sats mid 90's at home at rest. Will get 6MWT at next visit  Pulmonary rehab: referral placed.   Inhaler regimen:  Continue ICS/LAMA/LABA due to severity of obstruction on PFT. Cont PRN albuterol.   Tobacco cessation: quit ~2016, no relapse     At his last visit which was in 2021 he noted marked improvement in his breathing following initiation of Trelegy.  However, since that time he stopped using the Trelegy and noted worsening in his breathing with frequent wheezing and dyspnea.  He restarted his Trelegy 2 days ago and again noted marked improvement in his symptoms.     PFT 6/11/21 with 19% BD response    -Restart Trelegy.  Per epic this is well covered by his insurance, but if the medication is expensive we will change him to an alternate triple therapy  -Continue albuterol as needed  -6-minute walk on follow-up to assess oxygenation        ## Health maintenance  #Vaccines he has very strong opinions about vaccines.  We have previously discussed vaccines and he is adamant that he is not interested in vaccination.        #Lung cancer screening  Discussed at previous visits, and again this visit, however he is not interested in lung cancer screening.  Given the severity of his COPD, age, and preference to avoid medical interventions I think it is appropriate  that he declined further testing        #Hypertension  He self discontinued his lisinopril and blood pressure today was 191 systolic.  He denies any symptoms of hypertension such as headache, dizziness, or vision changes.  I recommended that he restart his lisinopril and follow-up with his cardiologist.        ## Health maintenance ***      Return to clinic: ***      I personally spent *** minutes on the date of the encounter doing chart review, history and exam, documentation and further activities per the note. This is independent from any time billed elsewhere for counseling or other activities.      Edgar Sandoval M.D.  Pulmonary & Critical Care  Pager: Click Here to page          History of Present Illness / Interval History:     Ag Galdamez is a 80 year old male with H/O COPD presents for follow-up    Last seen: 4/16/2024    Try to set up Follow-up in [facility]            Review of Systems:     ROS         Physical Examination:     BP (!) 170/84 (BP Location: Right arm, Patient Position: Chair, Cuff Size: Adult Regular)   Pulse 96   Wt 63.5 kg (140 lb)   SpO2 95%   BMI 22.26 kg/m      Physical Exam  Fingernails without*** clubbing        Data:     6-minute walk 7/31/2024  Walk distance reduced on room air with desaturation but not hypoxia.    All studies listed here were independently reviewed and interpreted by me today. ***        Medications:     Current Outpatient Medications   Medication Sig Dispense Refill    albuterol (PROAIR HFA/PROVENTIL HFA/VENTOLIN HFA) 108 (90 Base) MCG/ACT inhaler Inhale 2 puffs into the lungs every 4 hours as needed for shortness of breath 18 g 11    aspirin (ASA) 81 MG EC tablet Take 1 tablet (81 mg) by mouth daily (Patient not taking: Reported on 2/3/2023) 90 tablet 3    aspirin-acetaminophen-caffeine (EXCEDRIN MIGRAINE) 250-250-65 MG per tablet Take 2 tablets by mouth every 8 hours as needed for headaches  (Patient not taking: Reported on 2/3/2023)       atorvastatin (LIPITOR) 10 MG tablet Take 1 tablet (10 mg) by mouth daily No further refills until seen by cardiology--call [phone] to schedule 90 tablet 0    Cyanocobalamin (VITAMIN B-12 PO) Take 1 tablet by mouth daily      Fluticasone-Umeclidin-Vilant (TRELEGY ELLIPTA) 100-62.5-25 MCG/ACT oral inhaler Inhale 1 puff into the lungs daily 180 each 4    lisinopril (ZESTRIL) 10 MG tablet Take 1 tablet (10 mg) by mouth daily No further refills until seen by cardiology--call [phone] to schedule 90 tablet 0    multivitamin w/minerals (THERA-VIT-M) tablet Take 1 tablet by mouth daily      spacer (OPTICHAMBER KINGSLEY) holding chamber Use with inhaler (Patient not taking: Reported on 2/3/2023) 1 each 0    vitamin C (ASCORBIC ACID) 500 MG tablet Take 500 mg by mouth       No current facility-administered medications for this visit.             The above note was dictated using voice recognition software and may include typographical errors. Please contact the author for any clarifications.          vitamin C (ASCORBIC ACID) 500 MG tablet Take 500 mg by mouth       No current facility-administered medications for this visit.             The above note was dictated using voice recognition software and may include typographical errors. Please contact the author for any clarifications.

## 2025-07-01 NOTE — LETTER
7/1/2025      Ag Galdamez  [address]  [address]  [place] [address]      Dear Colleague,    Thank you for referring your patient, Ag Galdamez, to the [facility] FOR LUNG SCIENCE [facility]. Please see a copy of my visit note below.      [facility] [address] [address]  Phone: [phone]  Fax: [phone]          Pulmonology Clinic Follow up Visit       Ag Galdamez MRN# [id number]   YOB: 1945 Age: 80 year old     Date of Visit: 7/1/2025    Reason for Visit: Follow-up COPD          Assessment and Plan:     ## COPD / asthma overlap -very severe  Exacerbations past year: 0  Prior COPD hospitalizations: 1 (~2016, triggered by influenza)  Home action plan: none indicated  A1AT: not assessed, emphysema not prominent on CT but could consider in future  Home oxygen: No known indication. Sats mid 90's at home at rest. 6 in walk July 2024 normal  Pulmonary rehab: referral placed.   Inhaler regimen:  Continue ICS/LAMA/LABA due to severity of obstruction on PFT. Cont PRN albuterol.   Tobacco cessation: quit ~2016, no relapse     Previously noted marked improvement in breathing with consistent use of trelegy, but doesn't use it consistently. It doesn't bring immediate relief so he didn't understand the importance of remaining on maintenance therapy. Needing albuterol quite frequently.       PFT 6/11/21 with 19% BD response    -Restart Trelegy.  Per epic this is well covered by his insurance, but if the medication is expensive we will change him to an alternate triple therapy  -Continue albuterol as needed  -Consider changing to airsupra in the future but not currently covered by insurance      #Hypertension  He self discontinued his lisinopril and blood pressure today was 191 systolic.  He denies any symptoms of hypertension such as headache, dizziness, or  vision changes. He is concerned about potential SE's after reading about it. I explained the risks of uncontrolled htn, and that lisinopril is a good option given potential SE's of alternatives.  I encouraged him to restart lisinopril and follow up with his cardiologist.       ## Health maintenance  #Vaccines he has very strong opinions about vaccines.  We have previously discussed vaccines and he is adamant that he is not interested in vaccination.        #Lung cancer screening  Discussed at previous visits, and again this visit, however he is not interested in lung cancer screening.  Given the severity of his COPD, age, and preference to avoid medical interventions I think it is appropriate that he declined further testing          Return to clinic: 6 months. Will follow up in [facility] as it is closer      I personally spent 30 minutes on the date of the encounter doing chart review, history and exam, documentation and further activities per the note. This is independent from any time billed elsewhere for counseling or other activities.      Edgar Sandoval M.D.  Pulmonary & Critical Care  Pager: Click Here to page          History of Present Illness / Interval History:     Ag aGldamez is a 80 year old male with H/O COPD presents for follow-up    Last seen: 4/16/2024      - Uses albuterol multiple times a day, approximately 3 puffs per use, depending on activity level.  - Reports that albuterol opens up lungs effectively.  - Has been using Trelegy inconsistently, about twice to three times a week, and feels it doesn't provide immediate noticeable benefits.  - Has a history of COPD and asthma, with episodes of wheezing during shortness of breath.  - Stopped taking lisinopril due to concerns about its effectiveness and potential toxicity.  - Blood pressure was previously high, with systolic readings of 191 and diastolic of 83.  - Engaged in physical activities like walking and yard work, which may  influence breathing and albuterol use.  - Exploring natural remedies and dietary changes to manage blood pressure.           Review of Systems:     Review of Systems   Respiratory:  Positive for shortness of breath and wheezing.             Physical Examination:     BP (!) 170/84 (BP Location: Right arm, Patient Position: Chair, Cuff Size: Adult Regular)   Pulse 96   Wt 63.5 kg (140 lb)   SpO2 95%   BMI 22.26 kg/m      Physical Exam  Vitals and nursing note reviewed.   Constitutional:       Appearance: Normal appearance.   Cardiovascular:      Rate and Rhythm: Normal rate and regular rhythm.   Pulmonary:      Effort: Pulmonary effort is normal.      Breath sounds: Normal breath sounds.   Neurological:      Mental Status: He is alert.             Data:     6-minute walk 7/31/2024  Walk distance reduced on room air with desaturation but not hypoxia.    All studies listed here were independently reviewed and interpreted by me today.         Medications:     Current Outpatient Medications   Medication Sig Dispense Refill     albuterol (PROAIR HFA/PROVENTIL HFA/VENTOLIN HFA) 108 (90 Base) MCG/ACT inhaler Inhale 2 puffs into the lungs every 4 hours as needed for shortness of breath 18 g 11     aspirin (ASA) 81 MG EC tablet Take 1 tablet (81 mg) by mouth daily (Patient not taking: Reported on 2/3/2023) 90 tablet 3     aspirin-acetaminophen-caffeine (EXCEDRIN MIGRAINE) 250-250-65 MG per tablet Take 2 tablets by mouth every 8 hours as needed for headaches  (Patient not taking: Reported on 2/3/2023)       atorvastatin (LIPITOR) 10 MG tablet Take 1 tablet (10 mg) by mouth daily No further refills until seen by cardiology--call [phone] to schedule 90 tablet 0     Cyanocobalamin (VITAMIN B-12 PO) Take 1 tablet by mouth daily       Fluticasone-Umeclidin-Vilant (TRELEGY ELLIPTA) 100-62.5-25 MCG/ACT oral inhaler Inhale 1 puff into the lungs daily 180 each 4     lisinopril (ZESTRIL) 10 MG tablet Take 1 tablet (10 mg) by  mouth daily No further refills until seen by cardiology--call [phone] to schedule 90 tablet 0     multivitamin w/minerals (THERA-VIT-M) tablet Take 1 tablet by mouth daily       spacer (OPTICHAMBER KINGSLEY) holding chamber Use with inhaler (Patient not taking: Reported on 2/3/2023) 1 each 0     vitamin C (ASCORBIC ACID) 500 MG tablet Take 500 mg by mouth       No current facility-administered medications for this visit.             The above note was dictated using voice recognition software and may include typographical errors. Please contact the author for any clarifications.        Again, thank you for allowing me to participate in the care of your patient.        Sincerely,        Edgar Sandoval MD    Electronically signed

## 2025-07-14 ASSESSMENT — ENCOUNTER SYMPTOMS
WHEEZING: 1
SHORTNESS OF BREATH: 1